# Patient Record
Sex: FEMALE | Race: WHITE | Employment: OTHER | URBAN - METROPOLITAN AREA
[De-identification: names, ages, dates, MRNs, and addresses within clinical notes are randomized per-mention and may not be internally consistent; named-entity substitution may affect disease eponyms.]

---

## 2022-03-25 ENCOUNTER — OFFICE VISIT (OUTPATIENT)
Dept: NEUROLOGY | Age: 67
End: 2022-03-25
Payer: MEDICARE

## 2022-03-25 VITALS
DIASTOLIC BLOOD PRESSURE: 74 MMHG | HEART RATE: 88 BPM | HEIGHT: 63 IN | SYSTOLIC BLOOD PRESSURE: 111 MMHG | WEIGHT: 165 LBS | BODY MASS INDEX: 29.23 KG/M2

## 2022-03-25 DIAGNOSIS — R20.0 NUMBNESS IN BOTH HANDS: ICD-10-CM

## 2022-03-25 DIAGNOSIS — G25.0 BENIGN ESSENTIAL TREMOR: Primary | ICD-10-CM

## 2022-03-25 PROCEDURE — 99204 OFFICE O/P NEW MOD 45 MIN: CPT | Performed by: PSYCHIATRY & NEUROLOGY

## 2022-03-25 RX ORDER — SACUBITRIL AND VALSARTAN 24; 26 MG/1; MG/1
1 TABLET, FILM COATED ORAL 2 TIMES DAILY
COMMUNITY

## 2022-03-25 RX ORDER — CARVEDILOL 3.12 MG/1
6.25 TABLET ORAL
COMMUNITY

## 2022-03-25 RX ORDER — FOLIC ACID 1 MG/1
1 TABLET ORAL DAILY
COMMUNITY

## 2022-03-25 RX ORDER — DIPHENHYDRAMINE HCL 25 MG
25 TABLET ORAL EVERY 6 HOURS PRN
COMMUNITY

## 2022-03-25 RX ORDER — CITALOPRAM 10 MG/1
10 TABLET ORAL DAILY
COMMUNITY

## 2022-03-25 RX ORDER — ASPIRIN 81 MG/1
81 TABLET ORAL DAILY
COMMUNITY

## 2022-03-25 RX ORDER — FUROSEMIDE 20 MG/1
20 TABLET ORAL DAILY
COMMUNITY

## 2022-03-25 RX ORDER — BIOTIN 10 MG
TABLET ORAL
COMMUNITY

## 2022-03-25 RX ORDER — PREDNISONE 10 MG/1
10 TABLET ORAL DAILY
COMMUNITY

## 2022-03-25 NOTE — PROGRESS NOTES
NEUROLOGY CONSULTATION     Chief Complaint   Patient presents with    New Patient       HISTORY OF PRESENT ILLNESS :    Geraldo Arnold is a 69 y.o. female who is referred by Dr. Soumya Gordon   History was obtained from patient  Additional history was obtained from her . Patient was referred for evaluation of tremors. This involves both hands but the left is worse than the right. Symptoms started approximately a year ago. Patient also complains of tingling and numbness in both hands. These are worse at night. 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 She has had nonischemic cardiomyopathy. Patient has a pacemaker. She was found to be in heart block after she had episode of syncope. Patient has a family history of similar tremors in her mother.     REVIEW OF SYSTEMS    Constitutional:  []   Chills   []  Fatigue   []  Fevers   []  Malaise   []  Weight loss     [x] Denies all of the above    Eyes:  []  Double vision   []  Blurry vision     [x] Denies all of the above    Ears, nose, mouth, throat, and face:   [] Hearing loss    []   Hoarseness      [x]  Snoring    []  Tinnitus       [] Denies all of the above     Respiratory:   []  Cough    []  Shortness of breath         [] Denies all of the above     Cardiovascular:   []  Chest pain    []  Exertional chest pressure/discomfort           [] Palpitations    [x]  Syncope     [] Denies all of the above    Gastrointestinal:   []  Abdominal pain   []  Constipation    []  Diarrhea    []   Dysphagia                      [x] Denies all of the above    Genitourinary:      []  Frequency   []  Hematuria     []  Urinary incontinence           [x] Denies all of the above     Hematologic/lymphatic:  []  Bleeding    [x]  Easy bruising   []  Anemia  [] Denies all of the above     Musculoskeletal:   [] Back pain       []  Myalgias    []  Neck pain           [x] Denies all of the above    Neurological: As noted in HPI    Behavioral/Psych:   [] Anxiety    []  Depression     []  Mood swings     [x] Denies all of the above     Endocrine:   []  Temperature intolerance     [x] Fatigue      [] Denies all of the above     Allergic/Immunologic:   [] Hay fever    [] Denies all of the above     No past medical history on file. No family history on file. Social History     Socioeconomic History    Marital status:      Spouse name: Not on file    Number of children: Not on file    Years of education: Not on file    Highest education level: Not on file   Occupational History    Not on file   Tobacco Use    Smoking status: Not on file    Smokeless tobacco: Not on file   Substance and Sexual Activity    Alcohol use: Not on file    Drug use: Not on file    Sexual activity: Not on file   Other Topics Concern    Not on file   Social History Narrative    Not on file     Social Determinants of Health     Financial Resource Strain:     Difficulty of Paying Living Expenses: Not on file   Food Insecurity:     Worried About Running Out of Food in the Last Year: Not on file    Jayme of Food in the Last Year: Not on file   Transportation Needs:     Lack of Transportation (Medical): Not on file    Lack of Transportation (Non-Medical):  Not on file   Physical Activity:     Days of Exercise per Week: Not on file    Minutes of Exercise per Session: Not on file   Stress:     Feeling of Stress : Not on file   Social Connections:     Frequency of Communication with Friends and Family: Not on file    Frequency of Social Gatherings with Friends and Family: Not on file    Attends Church Services: Not on file    Active Member of Clubs or Organizations: Not on file    Attends Club or Organization Meetings: Not on file    Marital Status: Not on file   Intimate Partner Violence:     Fear of Current or Ex-Partner: Not on file    Emotionally Abused: Not on file    Physically Abused: Not on file    Sexually Abused: Not on file   Housing Stability:     Unable to Pay for Housing in the Last Year: Not on file    Number of Places Lived in the Last Year: Not on file    Unstable Housing in the Last Year: Not on file       PHYSICAL EXAMINATION:  /74   Pulse 88   Ht 5' 3\" (1.6 m)   Wt 165 lb (74.8 kg)   BMI 29.23 kg/m²   Appearance: Well appearing, well nourished and in no distress  Mental Status Exam: Patient is alert, oriented to person, place and time. Recent and remote memory is normal  Fund of Knowledge is normal  Attention/concentration is normal.   Speech : No dysarthria  Language : No aphasia  Funduscopic Exam: sharp disc margins  Cranial Nerves:   II: Visual fields:  Full to confrontation  III: Pupils:  equal, round, reactive to light  III,IV,VI: Extra Ocular Movements are intact. No nystagmus  V: Facial sensation is intact to pin prick and light touch  VII: Facial strength and movements: intact and symmetric smile,cheek puffing and eyebrow elevation  VIII: Hearing:  Intact to finger rub bilaterally  IX: Palate  elevation is symmetric  XI: Shoulder shrug is intact  XII: Tongue movements are normal  Motor:  Muscle tone and bulk are normal.  Patient has mild tremors of the outstretched hands. Strength is symmetrical 5/5 in all four extremities. Sensory: Intact to light touch and  pin prick in all four extremities  Coordination:  Normal  Finger to Nose and Heel to Shin bilaterally    . Reflexes:  DTR +2 and symmetric bilaterally  Plantar response: Flexor bilaterally  Gait: Gait and station is normal. Patient can toe/ heel and tandem walk without difficulty  Romberg: negative  Vascular: No carotid bruit bilaterally        DATA:  Lab test from outside hospital were reviewed.     IMPRESSION :  Benign familial essential tremor, mild  No clinical evidence for Parkinson's disease  Numbness in hands, probably carpal tunnel syndrome versus ulnar nerve entrapment      RECOMMENDATIONS :  Discussed with patient and her   Since the tremors are fairly mild we decided not to put her on any medication at this time  Primidone would not be a good drug since it would interact with her blood thinner Eliquis  Beta-blockers may also be a problem because of for pulmonary sarcoidosis but hopefully can be used in a small dose if necessary later after checking with her pulmonologist  I will see her back in the next several weeks to do EMG nerve conduction study of both upper extremities. Thank you for this consultation        Please note a portion of this chart was generated using dragon dictation software. Although every effort was made to ensure the accuracy of this automated transcription, some errors in transcription may have occurred.

## 2022-04-20 ENCOUNTER — PROCEDURE VISIT (OUTPATIENT)
Dept: NEUROLOGY | Age: 67
End: 2022-04-20

## 2022-04-20 ENCOUNTER — OFFICE VISIT (OUTPATIENT)
Dept: NEUROLOGY | Age: 67
End: 2022-04-20
Payer: MEDICARE

## 2022-04-20 VITALS
WEIGHT: 165 LBS | TEMPERATURE: 98 F | RESPIRATION RATE: 14 BRPM | HEIGHT: 63 IN | SYSTOLIC BLOOD PRESSURE: 104 MMHG | BODY MASS INDEX: 29.23 KG/M2 | HEART RATE: 73 BPM | DIASTOLIC BLOOD PRESSURE: 73 MMHG

## 2022-04-20 DIAGNOSIS — G56.23 ENTRAPMENT OF BOTH ULNAR NERVES AT ELBOW: Primary | ICD-10-CM

## 2022-04-20 DIAGNOSIS — G25.0 BENIGN ESSENTIAL TREMOR: Primary | ICD-10-CM

## 2022-04-20 PROCEDURE — 95911 NRV CNDJ TEST 9-10 STUDIES: CPT | Performed by: PSYCHIATRY & NEUROLOGY

## 2022-04-20 PROCEDURE — 99213 OFFICE O/P EST LOW 20 MIN: CPT | Performed by: PSYCHIATRY & NEUROLOGY

## 2022-04-20 PROCEDURE — 95886 MUSC TEST DONE W/N TEST COMP: CPT | Performed by: PSYCHIATRY & NEUROLOGY

## 2022-04-20 RX ORDER — SPIRONOLACTONE 25 MG/1
25 TABLET ORAL DAILY
COMMUNITY
Start: 2022-03-25

## 2022-04-20 NOTE — PROGRESS NOTES
Rocco Zheng   Neurology followup    Subjective:   CC/HP  History was obtained from the patient. Patient is here for a follow-up visit and EMG studies. She continues to have the tremors in her hands. She also continues to have numbness and tingling in her hands right worse than left. Background history:  Patient was referred for evaluation of tremors. This involves both hands but the left is worse than the right. Symptoms started approximately a year ago. Patient also complains of tingling and numbness in both hands. These are worse at night. 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 She has had nonischemic cardiomyopathy. Patient has a pacemaker. She was found to be in heart block after she had episode of syncope.   Patient has a family history of similar tremors in her mother.       REVIEW OF SYSTEMS    Constitutional:  []   Chills   []  Fatigue   []  Fevers   []  Malaise   []  Weight loss     [] Denies all of the above    Respiratory:   []  Cough    []  Shortness of breath         [] Denies all of the above     Cardiovascular:   []  Chest pain    []  Exertional chest pressure/discomfort           [] Palpitations    []  Syncope     [] Denies all of the above        Past Medical History:   Diagnosis Date    Abdominal pain     Bundle branch block, left     with left posterior fascicular block    Cardiac sarcoidosis (HCC)     Carpal tunnel syndrome     Cervical strain     CHF (congestive heart failure) (HCC)     Chronic ethmoidal sinusitis     Chronic maxillary sinusitis     Colonic obstruction (HCC)     COVID-19     History of colon polyps     Hyperglycemia     Hypertension     Non-caseating granuloma     Osteoarthritis     Pericardial constriction     Pleural effusion in other conditions classified elsewhere     Pulmonary sarcoidosis (HCC)     Right bundle branch block     S/P pericardial window creation     Sarcoidosis     Sick sinus syndrome ([address]     Tremor      Family History   Problem Relation Age of Onset    Dementia Father     Other Sister     Breast Cancer Sister     Breast Cancer Maternal Aunt     Colon Cancer Other      Social History     Socioeconomic History    Marital status:      Spouse name: None    Number of children: None    Years of education: None    Highest education level: None   Occupational History    None   Tobacco Use    Smoking status: Never Smoker    Smokeless tobacco: Never Used   Vaping Use    Vaping Use: Never used   Substance and Sexual Activity    Alcohol use: Yes     Alcohol/week: 7.0 standard drinks     Types: 7 Shots of liquor per week    Drug use: Never    Sexual activity: Yes     Partners: Male   Other Topics Concern    None   Social History Narrative    None     Social Determinants of Health     Financial Resource Strain:     Difficulty of Paying Living Expenses: Not on file   Food Insecurity:     Worried About Running Out of Food in the Last Year: Not on file    Jayme of Food in the Last Year: Not on file   Transportation Needs:     Lack of Transportation (Medical): Not on file    Lack of Transportation (Non-Medical):  Not on file   Physical Activity:     Days of Exercise per Week: Not on file    Minutes of Exercise per Session: Not on file   Stress:     Feeling of Stress : Not on file   Social Connections:     Frequency of Communication with Friends and Family: Not on file    Frequency of Social Gatherings with Friends and Family: Not on file    Attends Mu-ism Services: Not on file    Active Member of Clubs or Organizations: Not on file    Attends Club or Organization Meetings: Not on file    Marital Status: Not on file   Intimate Partner Violence:     Fear of Current or Ex-Partner: Not on file    Emotionally Abused: Not on file    Physically Abused: Not on file    Sexually Abused: Not on file   Housing Stability:     Unable to Pay for Housing in the Last Year: Not on file    Number of Places Lived in the Last Year: Not on file    Unstable Housing in the Last Year: Not on file        Objective:  Exam:  /73   Pulse 73   Temp 98 °F (36.7 °C)   Resp 14   Ht 5' 3\" (1.6 m)   Wt 165 lb (74.8 kg)   BMI 29.23 kg/m²   This is a well-nourished patient in no acute distress  Patient is awake, alert and oriented x3. Speech is normal.  Pupils are equal round reacting to light. Extraocular movements intact. Face symmetrical. Tongue midline. Motor exam shows normal symmetrical strength. Deep tendon reflexes normal. Plantar reflexes downgoing. Mild tremors of the outstretched hands  Sensory exam normal. Coordination normal. Gait normal. No carotid bruit. No neck stiffness. 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 There was also mild carpal tunnel syndrome. Plan :  Discussed with patient and her   Since the tremors are fairly mild we decided not to put her on any medication at this time  Primidone would not be a good drug since it would interact with her blood thinner Eliquis  Beta-blockers may also be a problem because of for pulmonary sarcoidosis but hopefully can be used in a small dose if necessary later after checking with her pulmonologist  Conservative management for the ulnar nerve entrapment at the elbow at this time. If symptoms get worse she will see a hand surgeon of her choice. I will see her back in 6 months for follow-up. Please note a portion of  this chart was generated using dragon dictation software. Although every effort was made to ensure the accuracy of this automated transcription, some errors in transcription may have occurred.

## 2022-04-20 NOTE — PROGRESS NOTES
Zoe Vargas M.D. [facility]) Physicians/[place] [facility]  Board Certified in [address] [facility] [address] [facility], [address]    EMG / NERVE CONDUCTION STUDY      PATIENT:  Rocco Zheng       DATE OF EM22     YOB: 1955       REASON FOR EMG:   Bilateral hand numbness      REFERRING PHYSICIAN:  Zoe Vargas MD  [address] [place] [address], [address] [facility],  [address]     SUMMARY:   Bilateral median sensory nerve studies with slightly prolonged distal latencies. Bilateral median motor nerve studies were normal.  Bilateral ulnar motor nerve studies with slowing of conduction velocities across the elbow. Bilateral ulnar sensory nerve studies were normal.  The left radial sensory nerve study was normal.  Needle EMG of several muscles in both upper extremities was normal.      CLINICAL DIAGNOSIS:  Carpal tunnel syndrome        EMG RESULTS:     1. This patient has moderately severe bilateral ulnar nerve lesions at the elbow. The right side is more involved when compared to the left side. 2.  This patient also has mild bilateral median nerve lesions at the wrist.  (Carpal tunnel syndrome. Again, the right side is slightly worse than the left side. ---------------------------------------------  Zoe Vargas M.D.   Electromyographer / Neurologist

## 2023-01-03 ENCOUNTER — OFFICE VISIT (OUTPATIENT)
Dept: NEUROLOGY | Age: 68
End: 2023-01-03
Payer: MEDICARE

## 2023-01-03 VITALS
HEIGHT: 63 IN | HEART RATE: 80 BPM | WEIGHT: 165 LBS | DIASTOLIC BLOOD PRESSURE: 59 MMHG | SYSTOLIC BLOOD PRESSURE: 97 MMHG | BODY MASS INDEX: 29.23 KG/M2

## 2023-01-03 DIAGNOSIS — G25.0 BENIGN ESSENTIAL TREMOR: Primary | ICD-10-CM

## 2023-01-03 PROCEDURE — 99213 OFFICE O/P EST LOW 20 MIN: CPT | Performed by: PSYCHIATRY & NEUROLOGY

## 2023-01-03 PROCEDURE — 1123F ACP DISCUSS/DSCN MKR DOCD: CPT | Performed by: PSYCHIATRY & NEUROLOGY

## 2023-01-03 RX ORDER — ROSUVASTATIN CALCIUM 10 MG/1
TABLET, COATED ORAL
COMMUNITY
Start: 2022-11-30

## 2023-01-03 NOTE — PROGRESS NOTES
Chris Cain   Neurology followup    Subjective:   CC/HP  History was obtained from the patient. Patient is here for a follow-up visit   Patient stated that the tremors are well controlled and she is very minimal tremors noted. She also continues to have numbness and tingling in her hands right worse than left. Background history:  Patient was referred for evaluation of tremors. This involves both hands but the left is worse than the right. Symptoms started approximately a year ago. Patient also complains of tingling and numbness in both hands. These are worse at night. 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 She has had nonischemic cardiomyopathy. Patient has a pacemaker. She was found to be in heart block after she had episode of syncope. Patient has a family history of similar tremors in her mother.        REVIEW OF SYSTEMS    Constitutional:  []   Chills   [x]  Fatigue   []  Fevers   []  Malaise   []  Weight loss     [] Denies all of the above    Respiratory:   []  Cough    [x]  Shortness of breath         [] Denies all of the above     Cardiovascular:   [x]  Chest pain    [x]  Exertional chest pressure/discomfort           [] Palpitations    []  Syncope     [] Denies all of the above        Past Medical History:   Diagnosis Date    Abdominal pain     Bundle branch block, left     with left posterior fascicular block    Cardiac sarcoidosis (HCC)     Carpal tunnel syndrome     Cervical strain     CHF (congestive heart failure) (HCC)     Chronic ethmoidal sinusitis     Chronic maxillary sinusitis     Colonic obstruction ([address]     COVID-19     History of colon polyps     Hyperglycemia     Hypertension     Non-caseating granuloma     Osteoarthritis     Pericardial constriction     Pleural effusion in other conditions classified elsewhere     Pulmonary sarcoidosis (HCC)     Right bundle branch block     S/P pericardial window creation     Sarcoidosis     Sick sinus syndrome ([address]    Tremor      Family History   Problem Relation Age of Onset    Dementia Father     Other Sister     Breast Cancer Sister     Breast Cancer Maternal Aunt     Colon Cancer Other      Social History     Socioeconomic History    Marital status:      Spouse name: None    Number of children: None    Years of education: None    Highest education level: None   Tobacco Use    Smoking status: Never    Smokeless tobacco: Never   Vaping Use    Vaping Use: Never used   Substance and Sexual Activity    Alcohol use: Yes     Alcohol/week: 7.0 standard drinks     Types: 7 Shots of liquor per week    Drug use: Never    Sexual activity: Yes     Partners: Male        Objective:  Exam:  BP (!) 97/59   Pulse 80   Ht 5' 3\" (1.6 m)   Wt 165 lb (74.8 kg)   BMI 29.23 kg/m²   This is a well-nourished patient in no acute distress  Patient is awake, alert and oriented x3. Speech is normal.  Pupils are equal round reacting to light. Extraocular movements intact. Face symmetrical. Tongue midline.  Motor exam shows normal symmetrical strength. Deep tendon reflexes normal. Plantar reflexes downgoing.  Mild tremors of the outstretched hands  Sensory exam normal. Coordination normal. Gait normal. No carotid bruit. No neck stiffness.      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  There was also mild carpal tunnel syndrome.    Plan :  Discussed with patient and her   Since the tremors are fairly mild we decided not to put her on any medication at this time  Primidone would not be a good drug since it would interact with her blood thinner Eliquis  Beta-blockers may also be a problem because of for pulmonary sarcoidosis but hopefully can be used in a small dose if necessary later after checking with her  pulmonologist  Since her tremors are very minimal and stable, I will see her only on a as needed basis. Please note a portion of  this chart was generated using dragon dictation software. Although every effort was made to ensure the accuracy of this automated transcription, some errors in transcription may have occurred.

## 2023-04-24 ENCOUNTER — HOSPITAL ENCOUNTER (OUTPATIENT)
Dept: PHYSICAL THERAPY | Age: 68
Setting detail: THERAPIES SERIES
Discharge: HOME | End: 2023-04-24
Payer: MEDICARE

## 2023-04-24 DIAGNOSIS — R29.898 DECREASED STRENGTH OF UPPER EXTREMITY: ICD-10-CM

## 2023-04-24 DIAGNOSIS — M25.611 DECREASED ROM OF RIGHT SHOULDER: ICD-10-CM

## 2023-04-24 DIAGNOSIS — M25.511 RIGHT SHOULDER PAIN, UNSPECIFIED CHRONICITY: Primary | ICD-10-CM

## 2023-04-24 PROCEDURE — 97110 THERAPEUTIC EXERCISES: CPT

## 2023-04-24 PROCEDURE — 97161 PT EVAL LOW COMPLEX 20 MIN: CPT

## 2023-04-24 NOTE — PLAN OF CARE
[facility] [address]  Phone: [phone]   Fax: [phone]          Physical Therapy Certification    Dear Laurence Adam DO,    We had the pleasure of evaluating the following patient for physical therapy services at [address] [place] [address]. A summary of our findings can be found in the initial assessment below. This includes our plan of care. If you have any questions or concerns regarding these findings, please do not hesitate to contact me at the office phone number checked above. Thank you for the referral.       Physician Signature:_______________________________Date:__________________  By signing above (or electronic signature), therapists plan is approved by physician      Patient: Chandrika Stephens   : 1955   MRN: [id number]  Referring Physician: Laurence Adam DO      Evaluation Date: 2023     Medical Diagnosis Information:  Diagnosis: M75.41 Impingement right shoulder, M75.21: Bicipital Tendinitis                              ICD-10-CM    1. Right shoulder pain, unspecified chronicity  M25.511       2. Decreased ROM of right shoulder  M25.611       3. Decreased strength of upper extremity  R29.898                              Precautions/ Contra-indications: Defibrillator   Latex Allergy:  [x]NO      []YES  Preferred Language for Healthcare:   [x]English       []other:    C-SSRS Triggered by Intake questionnaire (Past 2 wk assessment ):   [x] No, Questionnaire did not trigger screening.   [] Yes, Patient intake triggered C-SSRS Screening      [] C-SSRS Screening completed  [] PCP notified via Epic     SUBJECTIVE: Patient stated complaint: Paul states that she has been having shoulder pain. She has arthritis in both shoulder. She is having pain when sleeping. Since the cortisone injection she has been able to sleep better. She also states that it has been very limiting.  She

## 2023-04-24 NOTE — FLOWSHEET NOTE
[facility] [address] [facility]  Phone: [phone] Fax: [phone]    Physical Therapy Treatment Note/ Progress Report:       Date:  2023    Patient Name:  Lissy Gupta    :  1955  MRN: [id number]  Restrictions/Precautions:    Medical/Treatment Diagnosis Information:  Diagnosis: M75.41 Impingement right shoulder, M75.21: Bicipital Tendinitis       ICD-10-CM    1. Right shoulder pain, unspecified chronicity  M25.511       2. Decreased ROM of right shoulder  M25.611       3.  Decreased strength of upper extremity  R29.898           Insurance/Certification information:  PT Insurance Information: [address] [place] [address] , [place]  Physician Information:  Vickie Mandel DO  Plan of care signed (Y/N):     Date of Patient follow up with Physician:      Progress Report: []  Yes  []  No     Date Range for reporting period:  Beginnin2023  Ending:     Progress report due (10 Rx/or 30 days whichever is less):      Recertification due (POC duration/ or 90 days whichever is less): 2023     Visit # Insurance Allowable Auth Needed   1 MN []Yes    []No     Pain level:  1-6/10     SUBJECTIVE:  See eval    OBJECTIVE: See eval  Observation:   Test measurements:      RESTRICTIONS/PRECAUTIONS: Defibrillator    Exercises/Interventions:   Therapeutic Ex (69349)  Min: Sets/sec Reps CUES/Notes   UBE   NV   Pendulum/Ball rolls      Cane AAROM flex/press      3 way Isomet      T- band Row/pinch      T- band lower pinch      T- band ER activation      Supine SA punch      SL ER/SL punch      Prone Rows/ext      Prone HAB/Prone Flex      Seat Table slides/Wall Slides      Seated HH Depression      No Money      Scap Wall Lat touches/wall walks            Standing flex/scap      Standing Punch      Lawnmower      AAROM flexion 1 15    AAROM ER 1 15    AAROM scaption 1 15    Serratus press 5\" 10                Manual Intervention

## 2023-04-26 ENCOUNTER — HOSPITAL ENCOUNTER (OUTPATIENT)
Dept: PHYSICAL THERAPY | Age: 68
Setting detail: THERAPIES SERIES
Discharge: HOME | End: 2023-04-26
Payer: MEDICARE

## 2023-04-26 PROCEDURE — 97112 NEUROMUSCULAR REEDUCATION: CPT

## 2023-04-26 PROCEDURE — 97140 MANUAL THERAPY 1/> REGIONS: CPT

## 2023-04-26 PROCEDURE — 97110 THERAPEUTIC EXERCISES: CPT

## 2023-04-26 NOTE — FLOWSHEET NOTE
with scapular clock and prone row/extension. Paul stated she was adequately fatigued by the end of the session. No pain. Will cont to benefit from skilled PT interventions to progress to PLOF. Return to Play: (if applicable)   []  Stage 1: Intro to Strength   []  Stage 2: Dynamic Strength and Intro to Plyometrics   []  Stage 3: Advanced Plyometrics and Intro to Throwing   []  Stage 4: Sport specific Training/Return to Sport     []  Ready to Return to Play, [employer]   []  Not Ready for Return to Sports   Comments:      Treatment/Activity Tolerance:  [x] Patient tolerated treatment well [] Patient limited by fatique  [] Patient limited by pain  [] Patient limited by other medical complications  [] Other:     Overall Progression Towards Functional goals/ Treatment Progress Update:  [] Patient is progressing as expected towards functional goals listed. [] Progression is slowed due to complexities/Impairments listed. [] Progression has been slowed due to co-morbidities. [x] Plan just implemented, too soon to assess goals progression <30days   [] Goals require adjustment due to lack of progress  [] Patient is not progressing as expected and requires additional follow up with physician  [] Other    Prognosis for POC: [x] Good [] Fair  [] Poor    Patient requires continued skilled intervention: [x] Yes  [] No      PLAN: See eval  [] Continue per plan of care [] Alter current plan (see comments)  [x] Plan of care initiated [] Hold pending MD visit [] Discharge    Electronically signed by: Aggie Dalton, PT DPT, MS    Note: If patient does not return for scheduled/recommended follow up visits, this note will serve as a discharge from care along with the most recent update on progress.

## 2023-05-01 ENCOUNTER — HOSPITAL ENCOUNTER (OUTPATIENT)
Dept: PHYSICAL THERAPY | Age: 68
Setting detail: THERAPIES SERIES
Discharge: HOME | End: 2023-05-01
Payer: MEDICARE

## 2023-05-01 PROCEDURE — 97110 THERAPEUTIC EXERCISES: CPT

## 2023-05-01 PROCEDURE — 97140 MANUAL THERAPY 1/> REGIONS: CPT

## 2023-05-01 PROCEDURE — 97112 NEUROMUSCULAR REEDUCATION: CPT

## 2023-05-01 NOTE — FLOWSHEET NOTE
[facility] [address]  Phone: [phone] Fax: [phone]    Physical Therapy Treatment Note/ Progress Report:       Date:  2023    Patient Name:  Burgess Rosario    :  1955  MRN: [id number]  Restrictions/Precautions:    Medical/Treatment Diagnosis Information:  Diagnosis: M75.41 Impingement right shoulder, M75.21: Bicipital Tendinitis       ICD-10-CM    1. Right shoulder pain, unspecified chronicity  M25.511       2. Decreased ROM of right shoulder  M25.611       3. Decreased strength of upper extremity  R29.898           Insurance/Certification information:  PT Insurance Information: [address] [place] [address] , [place]  Physician Information:  Yareli Constantino DO  Plan of care signed (Y/N):     Date of Patient follow up with Physician:      Progress Report: []  Yes  []  No     Date Range for reporting period:  Beginnin2023  Ending:     Progress report due (10 Rx/or 30 days whichever is less):      Recertification due (POC duration/ or 90 days whichever is less): 2023     Visit # Insurance Allowable Auth Needed   3 MN []Yes    []No     Pain level:  1-6/10     SUBJECTIVE:  Patient reports that her shoulder is doing well. Still having soreness an pain at times.      OBJECTIVE: See eval  Observation:   Test measurements:      RESTRICTIONS/PRECAUTIONS: Defibrillator    Exercises/Interventions:   Therapeutic Ex (95827)  Min: Sets/sec Reps CUES/Notes   UBE  4'    Pendulum/Ball rolls      Cane AAROM flex/press      3 way Isomet      T- band Row/pinch 2 10 red   T- band lower pinch 2 10 red   T- band ER activation      Supine SA punch      SL ER/SL punch      Prone Rows/ext      Prone HAB/Prone Flex      Seat Table slides/Wall Slides      Seated HH Depression      No Money 2 10 red   Scap Wall Lat touches/wall walks      FR AAROM flexion 1 20    Scapular Clock 1 10 Red, BL   Standing flex/scap      Standing

## 2023-05-03 ENCOUNTER — HOSPITAL ENCOUNTER (OUTPATIENT)
Dept: PHYSICAL THERAPY | Age: 68
Setting detail: THERAPIES SERIES
Discharge: HOME | End: 2023-05-03
Payer: MEDICARE

## 2023-05-03 NOTE — PROGRESS NOTES
[facility]     Physical Therapy  Cancellation/No-show Note  Patient Name:  Jennyfer Roque  :  1955   Date:  5/3/2023  Cancelled visits to date: 1  No-shows to date: 0    Patient status for today's appointment patient:  [x]  Cancelled  []  Rescheduled appointment  []  No-show     Reason given by patient:  [x]  Patient ill  []  Conflicting appointment  []  No transportation    []  Conflict with work  []  No reason given  []  Other:     Comments:      Phone call information:   []  Phone call made today to patient at _ time at number provided:      []  Patient answered, conversation as follows:    []  Patient did not answer, message left as follows:  []  Phone call not made today  [x]  Phone call not needed - pt contacted us to cancel and provided reason for cancellation.      Electronically signed by:  Mariana Velasco PT DPT, MS

## 2023-05-05 ENCOUNTER — APPOINTMENT (OUTPATIENT)
Dept: PHYSICAL THERAPY | Age: 68
End: 2023-05-05
Payer: MEDICARE

## 2023-05-10 ENCOUNTER — HOSPITAL ENCOUNTER (OUTPATIENT)
Dept: PHYSICAL THERAPY | Age: 68
Setting detail: THERAPIES SERIES
Discharge: HOME | End: 2023-05-10
Payer: MEDICARE

## 2023-05-10 PROCEDURE — 97140 MANUAL THERAPY 1/> REGIONS: CPT

## 2023-05-10 PROCEDURE — 97110 THERAPEUTIC EXERCISES: CPT

## 2023-05-10 PROCEDURE — 97112 NEUROMUSCULAR REEDUCATION: CPT

## 2023-05-10 NOTE — FLOWSHEET NOTE
[facility] [address]  Phone: [phone] Fax: [phone]    Physical Therapy Treatment Note/ Progress Report:       Date:  05/10/2023    Patient Name:  Maycol Maya    :  1955  MRN: [id number]  Restrictions/Precautions:    Medical/Treatment Diagnosis Information:  Diagnosis: M75.41 Impingement right shoulder, M75.21: Bicipital Tendinitis       ICD-10-CM    1. Right shoulder pain, unspecified chronicity  M25.511       2. Decreased ROM of right shoulder  M25.611       3. Decreased strength of upper extremity  R29.898           Insurance/Certification information:  PT Insurance Information: Lat[employer]ha Gtz  Physician Information:  Rivka [facility] DO  Plan of care signed (Y/N):     Date of Patient follow up with Physician:      Progress Report: []  Yes  []  No     Date Range for reporting period:  Beginnin2023  Ending:     Progress report due (10 Rx/or 30 days whichever is less):      Recertification due (POC duration/ or 90 days whichever is less): 2023     Visit # Insurance Allowable Auth Needed   4 MN []Yes    []No     Pain level:  -6/10     SUBJECTIVE:  Patient reports that she continues to show progress, is unsure if it is the cortisone injection or PT. She states that she does notice increased pain free ROM after manual interventions.      OBJECTIVE: See eval  Observation:   Test measurements:      RESTRICTIONS/PRECAUTIONS: Defibrillator    Exercises/Interventions:   Therapeutic Ex (05507)  Min: Sets/sec Reps CUES/Notes   UBE  4'    Pendulum/Ball rolls      Cane AAROM flex/press      3 way Isomet      T- band Row/pinch 2 10 red   T- band lower pinch 2 10 red   T- band ER activation      Supine SA punch      SL ER/SL punch      Prone Rows/ext      Prone HAB/Prone Flex      Seat Table slides/Wall Slides      Seated HH Depression      No Money 2 10 red   Scap Wall Lat touches/wall walks

## 2023-05-12 ENCOUNTER — HOSPITAL ENCOUNTER (OUTPATIENT)
Dept: PHYSICAL THERAPY | Age: 68
Setting detail: THERAPIES SERIES
Discharge: HOME | End: 2023-05-12
Payer: MEDICARE

## 2023-05-12 PROCEDURE — 97110 THERAPEUTIC EXERCISES: CPT

## 2023-05-12 PROCEDURE — 97140 MANUAL THERAPY 1/> REGIONS: CPT

## 2023-05-12 NOTE — FLOWSHEET NOTE
focus on posterior capsule mobility today with manual joint mobilizations and self stretches more specifically working on this area such as doorway ER stretch, IR towel stretch behind back, and butterfly stretching. Updated HEP with these tasks for improved carryover between sessions. Good improvement in OH flexion, ER behind head, and IR behind back noted at session conclusion. Return to Play: (if applicable)   []  Stage 1: Intro to Strength   []  Stage 2: Dynamic Strength and Intro to Plyometrics   []  Stage 3: Advanced Plyometrics and Intro to Throwing   []  Stage 4: Sport specific Training/Return to Sport     []  Ready to Return to Play, [employer]   []  Not Ready for Return to Sports   Comments:      Treatment/Activity Tolerance:  [x] Patient tolerated treatment well [] Patient limited by fatique  [] Patient limited by pain  [] Patient limited by other medical complications  [] Other:     Overall Progression Towards Functional goals/ Treatment Progress Update:  [x] Patient is progressing as expected towards functional goals listed. [] Progression is slowed due to complexities/Impairments listed. [] Progression has been slowed due to co-morbidities. [] Plan just implemented, too soon to assess goals progression <30days   [] Goals require adjustment due to lack of progress  [] Patient is not progressing as expected and requires additional follow up with physician  [] Other    Prognosis for POC: [x] Good [] Fair  [] Poor    Patient requires continued skilled intervention: [x] Yes  [] No      PLAN: 2x per week for 6-8 weeks  [x] Continue per plan of care [] Alter current plan (see comments)  [] Plan of care initiated [] Hold pending MD visit [] Discharge    Electronically signed by: Gunner Corona, PT, DPT, MS, SCS    Note: If patient does not return for scheduled/recommended follow up visits, this note will serve as a discharge from care along with the most recent update on progress.

## 2023-05-17 ENCOUNTER — HOSPITAL ENCOUNTER (OUTPATIENT)
Dept: PHYSICAL THERAPY | Age: 68
Setting detail: THERAPIES SERIES
Discharge: HOME | End: 2023-05-17
Payer: MEDICARE

## 2023-05-17 PROCEDURE — 97140 MANUAL THERAPY 1/> REGIONS: CPT

## 2023-05-17 PROCEDURE — 97110 THERAPEUTIC EXERCISES: CPT

## 2023-05-17 NOTE — FLOWSHEET NOTE
[facility] [address] [facility] [facility] [address]  Phone: [phone] Fax: [phone]    Physical Therapy Treatment Note/ Progress Report:       Date:  2023    Patient Name:  Sierra Tolentino    :  1955  MRN: [id number]  Restrictions/Precautions:    Medical/Treatment Diagnosis Information:  Diagnosis: M75.41 Impingement right shoulder, M75.21: Bicipital Tendinitis       ICD-10-CM    1. Right shoulder pain, unspecified chronicity  M25.511       2. Decreased ROM of right shoulder  M25.611       3. Decreased strength of upper extremity  R29.898           Insurance/Certification information:  PT Insurance Information: [place], [place]  Physician Information:  Sole Ma DO  Plan of care signed (Y/N):     Date of Patient follow up with Physician:      Progress Report: []  Yes  [x]  No     Date Range for reporting period:  Beginnin2023  Ending:     Progress report due (10 Rx/or 30 days whichever is less): 3777     Recertification due (POC duration/ or 90 days whichever is less): 2023     Visit # Insurance Allowable Auth Needed   6 MN []Yes    []No     Pain level:  1-610     SUBJECTIVE:  Patient reports that her shoulder is doing well. Able to reach higher but still has soreness at times.      OBJECTIVE: See eval  Observation:   Test measurements:      RESTRICTIONS/PRECAUTIONS: Defibrillator    Exercises/Interventions:   Therapeutic Ex (70724) Sets/sec Reps CUES/Notes   UBE  4 min 1/2 fwd/bwd   Pendulum/Ball rolls      Cane AAROM flex/press      Doorway ER stretch - for post caps mobility 10s 10 60 deg elevation   IR towel stretch behind back 10s 10    Butterfly stretch - seated in chair, hands behind head 10s 10    Wall wash 1 15    T- band Row/pinch 2 10 red   T- band lower pinch 2 10 red   No Money 2 10 red   FR AAROM flexion 1 20    Bicep curl ecc 5\" 10    Scapular Clock 1 10 Red, BL   Bicep Curl Ecc 5\" 10 5#   AAROM

## 2023-05-19 ENCOUNTER — HOSPITAL ENCOUNTER (OUTPATIENT)
Dept: PHYSICAL THERAPY | Age: 68
Setting detail: THERAPIES SERIES
Discharge: HOME | End: 2023-05-19
Payer: MEDICARE

## 2023-05-19 PROCEDURE — 97140 MANUAL THERAPY 1/> REGIONS: CPT

## 2023-05-19 PROCEDURE — 97110 THERAPEUTIC EXERCISES: CPT

## 2023-05-19 NOTE — FLOWSHEET NOTE
- 5 x weekly - 3 sets - 10 reps  - Standing Shoulder External Rotation AAROM with Dowel  - 1 x daily - 5 x weekly - 3 sets - 10 reps  - Supine Scapular Protraction in Flexion with Dumbbells  - 1 x daily - 5 x weekly - 1-2 sets - 10 reps - 5 seconds hold  - Prone Shoulder Row  - 1 x daily - 5 x weekly - 1-2 sets - 10 reps - 5 seconds hold    Patient Education  - Shoulder Impingement    GOALS:  Patient stated goal: No pain, playing golf, pickle ball  [] Progressing: [] Met: [] Not Met: [] Adjusted  Therapist goals for Patient:   Short Term Goals: To be achieved in: 2 weeks  1. Independent in HEP and progression per patient tolerance, in order to prevent re-injury. [] Progressing: [] Met: [] Not Met: [] Adjusted  2. Patient will have a decrease in pain to facilitate improvement in movement, function, and ADLs as indicated by Functional Deficits. [] Progressing: [] Met: [] Not Met: [] Adjusted    Long Term Goals: To be achieved in: 8 weeks  1. Disability index score of 5% or less for the DASH to assist with reaching prior level of function. [] Progressing: [] Met: [] Not Met: [] Adjusted  2. Patient will demonstrate increased AROM to Flexion 160, abd 150, IR to T12, ER to T4 to allow for proper joint functioning as indicated by patients Functional Deficits. [] Progressing: [] Met: [] Not Met: [] Adjusted  3. Patient will demonstrate an increase in right shoulder Strength to 5/5 to allow for proper functional mobility as indicated by patients Functional Deficits. [] Progressing: [] Met: [] Not Met: [] Adjusted  4. Patient will return to raising arm overhead 2x10 functional activities without increased symptoms or restriction. [] Progressing: [] Met: [] Not Met: [] Adjusted  5. Patient will return to swinging a golf club 1x10 times without increased symptoms or restriction. (patient specific functional goal)    [] Progressing: [] Met: [] Not Met: [] Adjusted     ASSESSMENT: ROM continues to improve.  Continued

## 2023-05-22 ENCOUNTER — HOSPITAL ENCOUNTER (OUTPATIENT)
Dept: PHYSICAL THERAPY | Age: 68
Setting detail: THERAPIES SERIES
Discharge: HOME | End: 2023-05-22
Payer: MEDICARE

## 2023-05-22 PROCEDURE — 97110 THERAPEUTIC EXERCISES: CPT

## 2023-05-22 PROCEDURE — 97112 NEUROMUSCULAR REEDUCATION: CPT

## 2023-05-22 PROCEDURE — 97140 MANUAL THERAPY 1/> REGIONS: CPT

## 2023-05-22 NOTE — FLOWSHEET NOTE
[facility] [address] [address]  Phone: [phone] Fax: [phone]    Physical Therapy Treatment Note/ Progress Report:       Date:  2023    Patient Name:  Jac Meyer    :  1955  MRN: [id number]  Restrictions/Precautions:    Medical/Treatment Diagnosis Information:  Diagnosis: M75.41 Impingement right shoulder, M75.21: Bicipital Tendinitis       ICD-10-CM    1. Right shoulder pain, unspecified chronicity  M25.511       2. Decreased ROM of right shoulder  M25.611       3. Decreased strength of upper extremity  R29.898           Insurance/Certification information:  PT Insurance Information: [address] [place] [address] , [place]  Physician Information:  Toribio Restrepo DO  Plan of care signed (Y/N):     Date of Patient follow up with Physician:      Progress Report: []  Yes  [x]  No     Date Range for reporting period:  Beginnin2023  Ending:     Progress report due (10 Rx/or 30 days whichever is less):      Recertification due (POC duration/ or 90 days whichever is less): 2023     Visit # Insurance Allowable Auth Needed   8 PN [place] []Yes    []No     Pain level:  1-6/10     SUBJECTIVE: Patient states that she was muscular sore after last session. Feels better today.      OBJECTIVE: See eval  Observation:   Test measurements:      RESTRICTIONS/PRECAUTIONS: Defibrillator    Exercises/Interventions:   Therapeutic Ex (58354) Sets/sec Reps CUES/Notes   UBE  4 min 1/2 fwd/bwd   Pendulum/Ball rolls      Cane AAROM flex/press      Doorway ER stretch - for post caps mobility 10s 10 60 deg elevation   IR towel stretch behind back 30s 3    Butterfly stretch - seated in chair, hands behind head 10s 10    Wall wash 1 15    T- band Row/pinch 2 10 red   T- band lower pinch 2 10 red   No Money 2 10 red   FR AAROM flexion 1 20    Bicep curl ecc 5\" 10    Scapular Clock 1 10 Red, BL   Bicep Curl Ecc 5\" 10 5#   AAROM flexion AAROM ER

## 2023-05-24 ENCOUNTER — HOSPITAL ENCOUNTER (OUTPATIENT)
Dept: PHYSICAL THERAPY | Age: 68
Setting detail: THERAPIES SERIES
Discharge: HOME | End: 2023-05-24
Payer: MEDICARE

## 2023-05-24 PROCEDURE — 97140 MANUAL THERAPY 1/> REGIONS: CPT

## 2023-05-24 PROCEDURE — 97110 THERAPEUTIC EXERCISES: CPT

## 2023-05-24 PROCEDURE — 97112 NEUROMUSCULAR REEDUCATION: CPT

## 2023-05-24 NOTE — FLOWSHEET NOTE
[facility] [address] [place] [place] [address]  Phone: [phone] Fax: [phone]    Physical Therapy Treatment Note/ Progress Report:       Date:  2023    Patient Name:  Jerry Galeazzi    :  1955  MRN: [id number]  Restrictions/Precautions:    Medical/Treatment Diagnosis Information:  Diagnosis: M75.41 Impingement right shoulder, M75.21: Bicipital Tendinitis       ICD-10-CM    1. Right shoulder pain, unspecified chronicity  M25.511       2. Decreased ROM of right shoulder  M25.611       3. Decreased strength of upper extremity  R29.898           Insurance/Certification information:  PT Insurance Information: [address] [place] [address] , [place]  Physician Information:  Elena Holman DO  Plan of care signed (Y/N):     Date of Patient follow up with Physician:      Progress Report: [x]  Yes  []  No     Date Range for reporting period:  Beginnin2023  Ending:     Progress report due (10 Rx/or 30 days whichever is less):      Recertification due (POC duration/ or 90 days whichever is less): 2023     Visit # Insurance Allowable Auth Needed   9 MN []Yes    []No     Pain level:  1-6/10     SUBJECTIVE: Patient states that she has made 85% improvement since starting therapy for her shoulder. She states that she is still having difficulty with reaching - she still gets twinges of pain every once in a while. She is able to place her hand behind her head now when laying down. She states that she is concerned for her defibrillator as she can feel it more through her skin.      Functional Disability Index:QDASH: : 34%    Functional Disability Index:QDASH: : 16%     OBJECTIVE: See eval  Observation:   Test measurements:      :             ROM PROM AROM  Comment     L R L R     Flexion     165 165     Abduction     170 130  170 after manual   ER     T6 T4     IR     T6 L2     Other             Other

## 2023-05-31 ENCOUNTER — HOSPITAL ENCOUNTER (OUTPATIENT)
Dept: PHYSICAL THERAPY | Age: 68
Setting detail: THERAPIES SERIES
Discharge: HOME | End: 2023-05-31
Payer: MEDICARE

## 2023-05-31 PROCEDURE — 97140 MANUAL THERAPY 1/> REGIONS: CPT

## 2023-05-31 PROCEDURE — 97112 NEUROMUSCULAR REEDUCATION: CPT

## 2023-05-31 PROCEDURE — 97110 THERAPEUTIC EXERCISES: CPT

## 2023-05-31 NOTE — FLOWSHEET NOTE
[facility] [address] [address]  Phone: [phone] Fax: [phone]    Physical Therapy Treatment Note/ Progress Report:       Date:  2023    Patient Name:  Biju Richard    :  1955  MRN: [id number]  Restrictions/Precautions:    Medical/Treatment Diagnosis Information:  Diagnosis: M75.41 Impingement right shoulder, M75.21: Bicipital Tendinitis       ICD-10-CM    1. Right shoulder pain, unspecified chronicity  M25.511       2. Decreased ROM of right shoulder  M25.611       3. Decreased strength of upper extremity  R29.898           Insurance/Certification information:  PT Insurance Information: [address] [place] [address] , [place]  Physician Information:  Maurisio Rea DO  Plan of care signed (Y/N):     Date of Patient follow up with Physician:      Progress Report: []  Yes  [x]  No     Date Range for reporting period:  Beginnin2023  Ending:     Progress report due (10 Rx/or 30 days whichever is less):      Recertification due (POC duration/ or 90 days whichever is less): 2023     Visit # Insurance Allowable Auth Needed   10 MN []Yes    []No     Pain level:  -6/10     SUBJECTIVE: Ceci Davis states that her shoulder started hurting several days ago out of the blue. Wasn't doing anything to provoke the pain. Pinpoint pain at RTC attachment.      Functional Disability Index:QDASH: : 34%    Functional Disability Index:QDASH: : 16%     OBJECTIVE: See eval  Observation:   Test measurements:      :             ROM PROM AROM  Comment     L R L R     Flexion     165 165     Abduction     170 130  170 after manual   ER     T6 T4     IR     T6 L2     Other             Other                    Strength R L Comment   Flexion 4+ 5     Abduction 4 Pain 5     ER 5 5     IR 5 5     Supraspinatus 4+ 5     Upper Trap 4       Lower Trap 4       Mid Trap         Rhomboids         Biceps 5 5     Triceps 5 5

## 2023-06-02 ENCOUNTER — HOSPITAL ENCOUNTER (OUTPATIENT)
Dept: PHYSICAL THERAPY | Age: 68
Setting detail: THERAPIES SERIES
Discharge: HOME | End: 2023-06-02
Payer: MEDICARE

## 2023-06-02 PROCEDURE — 97112 NEUROMUSCULAR REEDUCATION: CPT

## 2023-06-02 PROCEDURE — 97110 THERAPEUTIC EXERCISES: CPT

## 2023-06-02 PROCEDURE — 97140 MANUAL THERAPY 1/> REGIONS: CPT

## 2023-06-02 NOTE — FLOWSHEET NOTE
level of function. [x] Progressing: [] Met: [] Not Met: [] Adjusted  2. Patient will demonstrate increased AROM to Flexion 160, abd 150, IR to T12, ER to T4 to allow for proper joint functioning as indicated by patients Functional Deficits. [x] Progressing: [] Met: [] Not Met: [] Adjusted  3. Patient will demonstrate an increase in right shoulder Strength to 5/5 to allow for proper functional mobility as indicated by patients Functional Deficits. [x] Progressing: [] Met: [] Not Met: [] Adjusted  4. Patient will return to raising arm overhead 2x10 functional activities without increased symptoms or restriction. [] Progressing: [x] Met: [] Not Met: [] Adjusted  5. Patient will return to swinging a golf club 1x10 times without increased symptoms or restriction. (patient specific functional goal)    [] Progressing: [] Met: [x] Not Met: [] Adjusted     ASSESSMENT:  Venus Cedeno states she her shoulder has been exacerbated since last session. She states that she has most of her pain anterolateral shoulder. This session focused mainly on manual interventions to reduce her pain. Noted cont tenderness at UT, supraspinatus, subscap, lat. ER stretch at various degrees. Noted decreased pain with shoulder flexion after these interventions. Removed isometric ER this date. Removed shoulder extension this date. Will assess thoracic mobility next session to see if it is limiting shoulder motion.       Return to Play: (if applicable)   []  Stage 1: Intro to Strength   []  Stage 2: Dynamic Strength and Intro to Plyometrics   []  Stage 3: Advanced Plyometrics and Intro to Throwing   []  Stage 4: Sport specific Training/Return to Sport     []  Ready to Return to Play, [employer]   []  Not Ready for Return to Sports   Comments:      Treatment/Activity Tolerance:  [x] Patient tolerated treatment well [] Patient limited by fatique  [] Patient limited by pain  [] Patient limited by other medical complications  [] Other:

## 2023-06-05 ENCOUNTER — HOSPITAL ENCOUNTER (OUTPATIENT)
Dept: PHYSICAL THERAPY | Age: 68
Setting detail: THERAPIES SERIES
Discharge: HOME | End: 2023-06-05
Payer: MEDICARE

## 2023-06-05 PROCEDURE — 97140 MANUAL THERAPY 1/> REGIONS: CPT

## 2023-06-05 PROCEDURE — 97530 THERAPEUTIC ACTIVITIES: CPT

## 2023-06-05 NOTE — FLOWSHEET NOTE
Extension 2/5\" 10 Black   Standing Resisted Row 2/5\" 10 Black   Prone extension 5\" 15 5#   Prone scapular retraction 5\" 15 3#         Therapeutic Activity (70777)      UE throwing porgression      Dynamic UE stability      Earthquake Bar      Bodyblade                Therapeutic Exercise and NMR EXR  [x] (54111) Provided verbal/tactile cueing for activities related to strengthening, flexibility, endurance, ROM  for improvements in scapular, scapulothoracic and UE control with self care, reaching, carrying, lifting, house/yardwork, driving/computer work. [x] (96434) Provided verbal/tactile cueing for activities related to improving balance, coordination, kinesthetic sense, posture, motor skill, proprioception  to assist with  scapular, scapulothoracic and UE control with self care, reaching, carrying, lifting, house/yardwork, driving/computer work. Therapeutic Activities:    [x] (69958 or 32471) Provided verbal/tactile cueing for activities related to improving balance, coordination, kinesthetic sense, posture, motor skill, proprioception and motor activation to allow for proper function of scapular, scapulothoracic and UE control with self care, carrying, lifting, driving/computer work.      Home Exercise Program:    [x] (34455) Reviewed/Progressed HEP activities related to strengthening, flexibility, endurance, ROM of scapular, scapulothoracic and UE control with self care, reaching, carrying, lifting, house/yardwork, driving/computer work  [] (24194) Reviewed/Progressed HEP activities related to improving balance, coordination, kinesthetic sense, posture, motor skill, proprioception of scapular, scapulothoracic and UE control with self care, reaching, carrying, lifting, house/yardwork, driving/computer work      Manual Treatments:  PROM / STM / Oscillations-Mobs:  G-I, II, III, IV (PA's, Inf., Post.)  [x] (25994) Provided manual therapy to mobilize soft tissue/joints of cervical/CT, scapular GHJ and UE for

## 2023-06-07 ENCOUNTER — HOSPITAL ENCOUNTER (OUTPATIENT)
Dept: PHYSICAL THERAPY | Age: 68
Setting detail: THERAPIES SERIES
Discharge: HOME | End: 2023-06-07
Payer: MEDICARE

## 2023-06-07 PROCEDURE — 97140 MANUAL THERAPY 1/> REGIONS: CPT

## 2023-06-07 PROCEDURE — 97530 THERAPEUTIC ACTIVITIES: CPT

## 2023-06-07 PROCEDURE — 97112 NEUROMUSCULAR REEDUCATION: CPT

## 2023-06-07 NOTE — FLOWSHEET NOTE
Reviewed/Progressed HEP activities related to improving balance, coordination, kinesthetic sense, posture, motor skill, proprioception of scapular, scapulothoracic and UE control with self care, reaching, carrying, lifting, house/yardwork, driving/computer work      Manual Treatments:  PROM / STM / Oscillations-Mobs:  G-I, II, III, IV (PA's, Inf., Post.)  [x] (92885) Provided manual therapy to mobilize soft tissue/joints of cervical/CT, scapular GHJ and UE for the purpose of modulating pain, promoting relaxation,  increasing ROM, reducing/eliminating soft tissue swelling/inflammation/restriction, improving soft tissue extensibility and allowing for proper ROM for normal function with self care, reaching, carrying, lifting, house/yardwork, driving/computer work    Modalities:      Charges:  Timed Code Treatment Minutes: 52   Total Treatment Minutes: 52       [] EVAL (LOW) 60646 (typically 20 minutes face-to-face)  [] EVAL (MOD) 40200 (typically 30 minutes face-to-face)  [] EVAL (HIGH) 91235 (typically 45 minutes face-to-face)  [] RE-EVAL     [] RF(28782) x     [] DRY NEEDLE 1 OR 2 MUSCLES  [x] NMR (52149) x 1    [] DRY NEEDLE 3+ MUSCLES  [x] Manual (17844) x 1     [x] TA (09547) x   1  [] Mech Traction (19433)  [] ES(attended) (00525)     [] ES (un) (30058):   [] VASO (60673)  [] Other:    HEP instruction:  Access Code: [email or web address]  URL: [email or web address]  Date: 04/24/2023  Prepared by: Delroy Getting    Exercises  - Supine Shoulder Flexion Extension AAROM with Dowel  - 1 x daily - 5 x weekly - 1-2 sets - 10 reps  - Shoulder Scaption AAROM with Dowel  - 1 x daily - 5 x weekly - 3 sets - 10 reps  - Standing Shoulder External Rotation AAROM with Dowel  - 1 x daily - 5 x weekly - 3 sets - 10 reps  - Supine Scapular Protraction in Flexion with Dumbbells  - 1 x daily - 5 x weekly - 1-2 sets - 10 reps - 5 seconds hold  - Prone Shoulder Row  - 1 x daily - 5 x weekly - 1-2 sets - 10 reps - 5 seconds hold    Patient

## 2023-06-28 ENCOUNTER — HOSPITAL ENCOUNTER (OUTPATIENT)
Dept: PHYSICAL THERAPY | Age: 68
Setting detail: THERAPIES SERIES
Discharge: HOME | End: 2023-06-28
Payer: MEDICARE

## 2023-06-28 PROCEDURE — 97110 THERAPEUTIC EXERCISES: CPT

## 2023-06-28 PROCEDURE — 97140 MANUAL THERAPY 1/> REGIONS: CPT

## 2023-06-28 PROCEDURE — 97530 THERAPEUTIC ACTIVITIES: CPT

## 2023-06-30 ENCOUNTER — HOSPITAL ENCOUNTER (OUTPATIENT)
Dept: PHYSICAL THERAPY | Age: 68
Setting detail: THERAPIES SERIES
Discharge: HOME | End: 2023-06-30
Payer: MEDICARE

## 2023-06-30 PROCEDURE — 97112 NEUROMUSCULAR REEDUCATION: CPT

## 2023-06-30 PROCEDURE — 97140 MANUAL THERAPY 1/> REGIONS: CPT

## 2023-07-03 ENCOUNTER — HOSPITAL ENCOUNTER (OUTPATIENT)
Dept: PHYSICAL THERAPY | Age: 68
Setting detail: THERAPIES SERIES
Discharge: HOME | End: 2023-07-03
Payer: MEDICARE

## 2023-07-03 NOTE — PROGRESS NOTES
[address]     Physical Therapy  Cancellation/No-show Note  Patient Name:  Sebastián Ortiz  :  1955   Date:  7/3/2023  Cancelled visits to date: 1  No-shows to date: 0    Patient status for today's appointment patient:  []  Cancelled  []  Rescheduled appointment  []  No-show     Reason given by patient:  [x]  Patient ill  []  Conflicting appointment  []  No transportation    []  Conflict with work  []  No reason given  []  Other:     Comments:      Phone call information:   []  Phone call made today to patient at _ time at number provided:      []  Patient answered, conversation as follows:    []  Patient did not answer, message left as follows:  []  Phone call not made today  [x]  Phone call not needed - pt contacted us to cancel and provided reason for cancellation.      Electronically signed by:  Ryan Loja PT DPT, MS

## 2023-07-05 ENCOUNTER — HOSPITAL ENCOUNTER (OUTPATIENT)
Dept: PHYSICAL THERAPY | Age: 68
Setting detail: THERAPIES SERIES
Discharge: HOME | End: 2023-07-05
Payer: MEDICARE

## 2023-07-05 PROCEDURE — 97110 THERAPEUTIC EXERCISES: CPT

## 2023-07-05 PROCEDURE — 97140 MANUAL THERAPY 1/> REGIONS: CPT

## 2023-07-05 NOTE — FLOWSHEET NOTE
[address], [address] [place] [address]  Phone: [phone] Fax: [phone]  Physical Therapy Treatment Note/ Progress Report:       Date:  2023    Patient Name:  Saturnino Kc    :  1955  MRN: [id number]  Restrictions/Precautions:    Medical/Treatment Diagnosis Information:  Diagnosis: M75.41 Impingement right shoulder, M75.21: Bicipital Tendinitis       ICD-10-CM    1. Right shoulder pain, unspecified chronicity  M25.511       2. Decreased ROM of right shoulder  M25.611       3. Decreased strength of upper extremity  R29.898           Insurance/Certification information:  PT Insurance Information: [id number] [place], [place]  Physician Information:  Gordy Garcia DO  Plan of care signed (Y/N): yes    Date of Patient follow up with Physician:      Progress Report: []  Yes  [x]  No     Date Range for reporting period:  Beginnin2023  Ending: PN   POC     Progress report due (10 Rx/or 30 days whichever is less):      Recertification due (POC duration/ or 90 days whichever is less): 2023     Visit # Insurance Allowable Auth Needed   18 [place] []Yes    []No     Pain level:  -6/10     SUBJECTIVE: Patient states that she has felt more dizzy and nauseous this week. She had to cancel a lot of her plans due to this. She states that her shoulder continues to feel roughly the same. She took a baseline blood glucose at 110. Functional Disability Index:QDASH: : 34%    Functional Disability Index:QDASH: : 16%   Functional Disability Index:QDASH: : 36%     OBJECTIVE: See eval  Observation:   Test measurements:   : AC joint mobs no change, Inferior and posterior joint mobs no change. Scapular mobs hypomobile. STM with some changes to sensitivity at supraspinatus and biceps.      :             ROM PROM AROM  Comment     L R L R     Flexion    155 165 150 145 pre man    Abduction

## 2023-07-12 ENCOUNTER — HOSPITAL ENCOUNTER (OUTPATIENT)
Dept: PHYSICAL THERAPY | Age: 68
Setting detail: THERAPIES SERIES
Discharge: HOME | End: 2023-07-12
Payer: MEDICARE

## 2023-07-12 PROCEDURE — 97140 MANUAL THERAPY 1/> REGIONS: CPT

## 2023-07-12 PROCEDURE — 97110 THERAPEUTIC EXERCISES: CPT

## 2023-07-12 NOTE — FLOWSHEET NOTE
44 [address], [address] [place] [address]  Phone: [phone] Fax: [phone]  Physical Therapy Treatment Note/ Progress Report:       Date:  2023    Patient Name:  Tito Aldridge    :  1955  MRN: [id number]  Restrictions/Precautions:    Medical/Treatment Diagnosis Information:  Diagnosis: M75.41 Impingement right shoulder, M75.21: Bicipital Tendinitis       ICD-10-CM    1. Right shoulder pain, unspecified chronicity  M25.511       2. Decreased ROM of right shoulder  M25.611       3. Decreased strength of upper extremity  R29.898           Insurance/Certification information:  PT Insurance Information: [id number] [place], [place]  Physician Information:  Maranda Mantilla DO  Plan of care signed (Y/N): yes    Date of Patient follow up with Physician:      Progress Report: []  Yes  [x]  No     Date Range for reporting period:  Beginnin2023  Ending: PN   POC     Progress report due (10 Rx/or 30 days whichever is less):      Recertification due (POC duration/ or 90 days whichever is less): 2023     Visit # Insurance Allowable Auth Needed   19 [place] []Yes    []No     Pain level:  -6/10     SUBJECTIVE: Patient states that her dizziness has been improving. However she continues to have shoulder pain. She took a baseline blood glucose at 110. Functional Disability Index:QDASH: : 34%    Functional Disability Index:QDASH: : 16%   Functional Disability Index:QDASH: : 36%     OBJECTIVE: See eval  Observation:   Test measurements:   : AC joint mobs no change, Inferior and posterior joint mobs no change. Scapular mobs hypomobile. STM with some changes to sensitivity at supraspinatus and biceps.      :             ROM PROM AROM  Comment     L R L R     Flexion    155 165 150 145 pre man    Abduction     170 155  170 after manual   ER     T6 T4     IR     T6 L2     Other

## 2023-07-14 ENCOUNTER — HOSPITAL ENCOUNTER (OUTPATIENT)
Dept: PHYSICAL THERAPY | Age: 68
Setting detail: THERAPIES SERIES
Discharge: HOME | End: 2023-07-14
Payer: MEDICARE

## 2023-07-14 PROCEDURE — 97140 MANUAL THERAPY 1/> REGIONS: CPT

## 2023-07-14 PROCEDURE — 97110 THERAPEUTIC EXERCISES: CPT

## 2023-07-14 NOTE — FLOWSHEET NOTE
ES(attended) (94650)     [] ES (un) (36666):   [] VASO (90284)  [] Other:    HEP instruction:  Access Code: [email or web address]  URL: [email or web address]  Date: 04/24/2023  Prepared by: Reji Carlton    Exercises  - Supine Shoulder Flexion Extension AAROM with Dowel  - 1 x daily - 5 x weekly - 1-2 sets - 10 reps  - Shoulder Scaption AAROM with Dowel  - 1 x daily - 5 x weekly - 3 sets - 10 reps  - Standing Shoulder External Rotation AAROM with Dowel  - 1 x daily - 5 x weekly - 3 sets - 10 reps  - Supine Scapular Protraction in Flexion with Dumbbells  - 1 x daily - 5 x weekly - 1-2 sets - 10 reps - 5 seconds hold  - Prone Shoulder Row  - 1 x daily - 5 x weekly - 1-2 sets - 10 reps - 5 seconds hold    Patient Education  - Shoulder Impingement    GOALS:  Patient stated goal: No pain, playing golf, pickle ball  [x] Progressing: [] Met: [] Not Met: [] Adjusted  Therapist goals for Patient:   Short Term Goals: To be achieved in: 2 weeks  1. Independent in HEP and progression per patient tolerance, in order to prevent re-injury. [] Progressing: [x] Met: [] Not Met: [] Adjusted  2. Patient will have a decrease in pain to facilitate improvement in movement, function, and ADLs as indicated by Functional Deficits. [] Progressing: [x] Met: [] Not Met: [] Adjusted    Long Term Goals: To be achieved in: 8 weeks  1. Disability index score of 5% or less for the DASH to assist with reaching prior level of function. [x] Progressing: [] Met: [] Not Met: [] Adjusted  2. Patient will demonstrate increased AROM to Flexion 160, abd 150, IR to T12, ER to T4 to allow for proper joint functioning as indicated by patients Functional Deficits. [x] Progressing: [] Met: [] Not Met: [] Adjusted  3. Patient will demonstrate an increase in right shoulder Strength to 5/5 to allow for proper functional mobility as indicated by patients Functional Deficits. [x] Progressing: [] Met: [] Not Met: [] Adjusted  4.  Patient will return to raising arm

## 2023-07-17 ENCOUNTER — HOSPITAL ENCOUNTER (OUTPATIENT)
Dept: PHYSICAL THERAPY | Age: 68
Setting detail: THERAPIES SERIES
Discharge: HOME | End: 2023-07-17
Payer: MEDICARE

## 2023-07-17 PROCEDURE — 97140 MANUAL THERAPY 1/> REGIONS: CPT

## 2023-07-17 PROCEDURE — 97110 THERAPEUTIC EXERCISES: CPT

## 2023-07-17 NOTE — FLOWSHEET NOTE
[address], [address] [place] [address]  Phone: [phone] Fax: [phone]  Physical Therapy Treatment Note/ Progress Report:       Date:  2023    Patient Name:  Caesar Wright    :  1955  MRN: [id number]  Restrictions/Precautions:    Medical/Treatment Diagnosis Information:  Diagnosis: M75.41 Impingement right shoulder, M75.21: Bicipital Tendinitis       ICD-10-CM    1. Right shoulder pain, unspecified chronicity  M25.511       2. Decreased ROM of right shoulder  M25.611       3. Decreased strength of upper extremity  R29.898           Insurance/Certification information:  PT Insurance Information: [id number] [place], [place]  Physician Information:  Gege Rucker DO  Plan of care signed (Y/N): yes    Date of Patient follow up with Physician:      Progress Report: []  Yes  [x]  No     Date Range for reporting period:  Beginnin2023  Ending: PN   POC     Progress report due (10 Rx/or 30 days whichever is less): 2868     Recertification due (POC duration/ or 90 days whichever is less): 2023     Visit # Insurance Allowable Auth Needed   21 [place] []Yes    []No     Pain level:  -6/10     SUBJECTIVE: Patient states that she feels tight in her shoulder and arm. She states that she has not had any pain. She took a baseline blood glucose at 110. Functional Disability Index:QDASH: : 34%    Functional Disability Index:QDASH: : 16%   Functional Disability Index:QDASH: : 36%     OBJECTIVE: See eval  Observation:   Test measurements:   : Noted increased lymph node swelling under right armpit. : AC joint mobs no change, Inferior and posterior joint mobs no change. Scapular mobs hypomobile. STM with some changes to sensitivity at supraspinatus and biceps.      :             ROM PROM AROM  Comment     L R L R     Flexion    155 165 150 145 pre man    Abduction     170 155  170

## 2023-07-19 ENCOUNTER — HOSPITAL ENCOUNTER (OUTPATIENT)
Dept: PHYSICAL THERAPY | Age: 68
Setting detail: THERAPIES SERIES
Discharge: HOME | End: 2023-07-19
Payer: MEDICARE

## 2023-07-19 PROCEDURE — 97140 MANUAL THERAPY 1/> REGIONS: CPT

## 2023-07-19 PROCEDURE — 97110 THERAPEUTIC EXERCISES: CPT

## 2023-07-19 NOTE — FLOWSHEET NOTE
proper joint functioning as indicated by patients Functional Deficits. [x] Progressing: [] Met: [] Not Met: [] Adjusted  3. Patient will demonstrate an increase in right shoulder Strength to 5/5 to allow for proper functional mobility as indicated by patients Functional Deficits. [x] Progressing: [] Met: [] Not Met: [] Adjusted  4. Patient will return to raising arm overhead 2x10 functional activities without increased symptoms or restriction. [] Progressing: [x] Met: [] Not Met: [] Adjusted  5. Patient will return to swinging a golf club 1x10 times without increased symptoms or restriction. (patient specific functional goal)    [] Progressing: [] Met: [x] Not Met: [] Adjusted     ASSESSMENT: Increased range of motion in isolated abduction this date after extensive time with inferior joint mobilizations. Noted increased pain free ROM after these motions as well as overall increased ROM. Followed with intermittent serratus press as well as AAROM into flexion. This is the most improvement that Paul has had in [address] [place] [address]. Continues to have soreness in supraspinatus. Edu on decreased soreness if impingement can resolve. Will cont with this plan. PN next week. Return to Play: (if applicable)   []  Stage 1: Intro to Strength   []  Stage 2: Dynamic Strength and Intro to Plyometrics   []  Stage 3: Advanced Plyometrics and Intro to Throwing   []  Stage 4: Sport specific Training/Return to Sport     []  Ready to Return to Play, [employer]   []  Not Ready for Return to Sports   Comments:      Treatment/Activity Tolerance:  [x] Patient tolerated treatment well [] Patient limited by fatique  [] Patient limited by pain  [] Patient limited by other medical complications  [] Other:     Overall Progression Towards Functional goals/ Treatment Progress Update:  [x] Patient is progressing as expected towards functional goals listed. [] Progression is slowed due to complexities/Impairments listed.   [] Progression has

## 2023-07-26 ENCOUNTER — HOSPITAL ENCOUNTER (OUTPATIENT)
Dept: PHYSICAL THERAPY | Age: 68
Setting detail: THERAPIES SERIES
Discharge: HOME | End: 2023-07-26
Payer: MEDICARE

## 2023-07-26 PROCEDURE — 97110 THERAPEUTIC EXERCISES: CPT

## 2023-07-26 PROCEDURE — 97140 MANUAL THERAPY 1/> REGIONS: CPT

## 2023-07-26 NOTE — FLOWSHEET NOTE
Progression has been slowed due to co-morbidities. [] Plan just implemented, too soon to assess goals progression <30days   [] Goals require adjustment due to lack of progress  [] Patient is not progressing as expected and requires additional follow up with physician  [] Other    Prognosis for POC: [x] Good [] Fair  [] Poor    Patient requires continued skilled intervention: [x] Yes  [] No      PLAN: 2x per week for 6-8 weeks  [x] Continue per plan of care [] Alter current plan (see comments)  [] Plan of care initiated [] Hold pending MD visit [] Discharge    Electronically signed by: Keri Jara, PT, DPT, MS    Note: If patient does not return for scheduled/recommended follow up visits, this note will serve as a discharge from care along with the most recent update on progress.

## 2023-07-28 ENCOUNTER — HOSPITAL ENCOUNTER (OUTPATIENT)
Dept: PHYSICAL THERAPY | Age: 68
Setting detail: THERAPIES SERIES
Discharge: HOME | End: 2023-07-28
Payer: MEDICARE

## 2023-07-28 PROCEDURE — 97110 THERAPEUTIC EXERCISES: CPT

## 2023-07-28 PROCEDURE — 97140 MANUAL THERAPY 1/> REGIONS: CPT

## 2023-07-28 NOTE — FLOWSHEET NOTE
[address], [address] [place] [address]  Phone: [phone] Fax: [phone]  Physical Therapy Treatment Note/ Progress Report:       Date:  2023    Patient Name:  Herminio Jordan    :  1955  MRN: [id number]  Restrictions/Precautions:    Medical/Treatment Diagnosis Information:  Diagnosis: M75.41 Impingement right shoulder, M75.21: Bicipital Tendinitis       ICD-10-CM    1. Right shoulder pain, unspecified chronicity  M25.511       2. Decreased ROM of right shoulder  M25.611       3. Decreased strength of upper extremity  R29.898           Insurance/Certification information:  PT Insurance Information: [id number] [place], [place]  Physician Information:  Frandy Mary DO  Plan of care signed (Y/N): yes    Date of Patient follow up with Physician:      Progress Report: []  Yes  [x]  No     Date Range for reporting period:  Beginnin2023  Ending: PN   POC     Progress report due (10 Rx/or 30 days whichever is less):      Recertification due (POC duration/ or 90 days whichever is less): 2023     Visit # Insurance Allowable Auth Needed   24 [place] []Yes    []No     Pain level:  -6/10     SUBJECTIVE: Patient states that she is feeling okay. Range continues to be restricted. She took a baseline blood glucose at 110. Functional Disability Index:QDASH: : 34%    Functional Disability Index:QDASH: : 16%   Functional Disability Index:QDASH: : 36%     OBJECTIVE: See eval  Observation:   Test measurements:   : Noted increased lymph node swelling under right armpit. : AC joint mobs no change, Inferior and posterior joint mobs no change. Scapular mobs hypomobile. STM with some changes to sensitivity at supraspinatus and biceps.      :             ROM PROM AROM  Comment     L R L R     Flexion    155 165 150 145 pre man    Abduction     170 155  170 after manual   ER     T6 T4

## 2023-08-01 ENCOUNTER — HOSPITAL ENCOUNTER (OUTPATIENT)
Dept: PHYSICAL THERAPY | Age: 68
Setting detail: THERAPIES SERIES
Discharge: HOME | End: 2023-08-01
Payer: MEDICARE

## 2023-08-01 PROCEDURE — 97140 MANUAL THERAPY 1/> REGIONS: CPT

## 2023-08-01 PROCEDURE — 97110 THERAPEUTIC EXERCISES: CPT

## 2023-08-01 PROCEDURE — 97530 THERAPEUTIC ACTIVITIES: CPT

## 2023-08-01 NOTE — FLOWSHEET NOTE
Adjusted  2. Patient will demonstrate increased AROM to Flexion 160, abd 150, IR to T12, ER to T4 to allow for proper joint functioning as indicated by patients Functional Deficits. [x] Progressing: [] Met: [] Not Met: [x] Adjusted Met 160 flexion this date. IR limited. 3. Patient will demonstrate an increase in right shoulder Strength to 5/5 to allow for proper functional mobility as indicated by patients Functional Deficits. [x] Progressing: [] Met: [] Not Met: [] Adjusted  4. Patient will return to raising arm overhead 2x10 functional activities without increased symptoms or restriction. [] Progressing: [x] Met: [] Not Met: [] Adjusted  5. Patient will return to swinging a golf club 1x10 times without increased symptoms or restriction. (patient specific functional goal)    [] Progressing: [] Met: [x] Not Met: [] Adjusted     ASSESSMENT: POC this date. Paul continues to demonstrate very slight improvements with therapy. She demonstrates improved functional ROM into shoulder flexion. (, 162 this date supine). Extensive inferior glides to gain back mobility to shoulder. Noted most improvements come from LLLD stretching into flexion. EDU on continuing these interventions at home. Would like to move to once a week with increase in HEP LLLD stretching to gain residual motion. Did note what seemed to be a swollen lymph node in armpit with STM. Paul was encouraged to make an appt for mammogram considering PMH breast cancer.      Return to Play: (if applicable)   []  Stage 1: Intro to Strength   []  Stage 2: Dynamic Strength and Intro to Plyometrics   []  Stage 3: Advanced Plyometrics and Intro to Throwing   []  Stage 4: Sport specific Training/Return to Sport     []  Ready to Return to Play, [employer]   []  Not Ready for Return to Sports   Comments:      Treatment/Activity Tolerance:  [x] Patient tolerated treatment well [] Patient limited by fatique  [] Patient limited by pain  [] Patient limited

## 2023-08-03 ENCOUNTER — HOSPITAL ENCOUNTER (OUTPATIENT)
Dept: PHYSICAL THERAPY | Age: 68
Setting detail: THERAPIES SERIES
Discharge: HOME | End: 2023-08-03
Payer: MEDICARE

## 2023-08-03 PROCEDURE — 97140 MANUAL THERAPY 1/> REGIONS: CPT

## 2023-08-03 PROCEDURE — 97110 THERAPEUTIC EXERCISES: CPT

## 2023-08-03 NOTE — FLOWSHEET NOTE
to T12, ER to T4 to allow for proper joint functioning as indicated by patients Functional Deficits. [x] Progressing: [] Met: [] Not Met: [x] Adjusted Met 160 flexion this date. IR limited. 3. Patient will demonstrate an increase in right shoulder Strength to 5/5 to allow for proper functional mobility as indicated by patients Functional Deficits. [x] Progressing: [] Met: [] Not Met: [] Adjusted  4. Patient will return to raising arm overhead 2x10 functional activities without increased symptoms or restriction. [] Progressing: [x] Met: [] Not Met: [] Adjusted  5. Patient will return to swinging a golf club 1x10 times without increased symptoms or restriction. (patient specific functional goal)    [] Progressing: [] Met: [x] Not Met: [] Adjusted     ASSESSMENT: Continued to note increases in flexion ROM with LLLD. Emphasized this intervention for HEP. Finished session with lat retraction with increased weight and time under tension. Will assess her response and tolerance with 1x per week at therapy. Overall, will cont to benefit from skilled PT interventions to progress to PLOF. Return to Play: (if applicable)   []  Stage 1: Intro to Strength   []  Stage 2: Dynamic Strength and Intro to Plyometrics   []  Stage 3: Advanced Plyometrics and Intro to Throwing   []  Stage 4: Sport specific Training/Return to Sport     []  Ready to Return to Play, [employer]   []  Not Ready for Return to Sports   Comments:      Treatment/Activity Tolerance:  [x] Patient tolerated treatment well [] Patient limited by fatique  [] Patient limited by pain  [] Patient limited by other medical complications  [] Other:     Overall Progression Towards Functional goals/ Treatment Progress Update:  [x] Patient is progressing as expected towards functional goals listed. [] Progression is slowed due to complexities/Impairments listed. [] Progression has been slowed due to co-morbidities.   [] Plan just implemented, too soon

## 2023-08-09 ENCOUNTER — HOSPITAL ENCOUNTER (OUTPATIENT)
Dept: PHYSICAL THERAPY | Age: 68
Setting detail: THERAPIES SERIES
Discharge: HOME | End: 2023-08-09
Payer: MEDICARE

## 2023-08-09 PROCEDURE — 97140 MANUAL THERAPY 1/> REGIONS: CPT

## 2023-08-09 PROCEDURE — 97110 THERAPEUTIC EXERCISES: CPT

## 2023-08-09 NOTE — FLOWSHEET NOTE
Slide 1 10    Wall [place] 1 10 Both ways   Forward flexion c retraction 1 10    Forward flexion 1 10 C scapular assist   Side lying chicken wing contract relax 3\" 10    Banded posterior glide 2 10    TRX walk out AAROM flexion 1 20    TRX serratus press 2 15    ER iso neutral 10\" 10 Red   Sleeper stretch 10\" 10 Just into restriction   Resisted Lat 2/5\" 20 green   Foam roller flexion with ER iso 2 10 red   Thoracic extension chair 1 20    Supine shoulder flexion  2/3\" 10 3 pound bar   Side lying abduction 2/3\" 10    Manual Intervention  (42609)      AC joint mobs  5' Improved ROM. Prone Hands on head inferior stretch  2'    STM   5' teres minor/ supraspinatus/subscap/ lat   Sleeper IR  3'    Shld /GH Mobs - supine, sidelying  20 min Inferior, posterior  Gr. II-III   Posterior capsule mobs - sidelying, prone  5 min Gr. II-III   Shoulder PROM/stretching  5 min    Scapular mobs- sidelying, supine 8  min Gr. II-III   Scapular mob with flexion ROM Sidelying 2' min    Prone thoracic PA 7' Improved pain free range   STM  7' Posterior RTC. Bicep         NMR re-education (96579)      Lower trap setting SB 1 20          Scapular retraction against towel.  10\" 10    Lat engagement in shoulder flex 2/5\" 10-15 3 plates   Lat engagement with shoulder ext 2 15    Sidelying serratus punch 3\" 15 Yellow sand ball   Sidelying short lever arm abd \"chicken wing\"  1 15 Cues for scap depression as she moves [place]   Scapular squeeze sitting Prone I 3\" 10    Prone Y 3\" 10    Diagonal Y supine 3\" 20 Green HEP   Supine Horizontal abduction 10\" 15 Green   Standing Resisted Extension 2/5\" 10 Black   Standing Resisted Row 2/5\" 10 Black   Prone extension 5\" 15 5#   Prone scapular retraction 5\" 15 3#   Standing TB serratus punch - 2 plates 2 10 Needs significant tactile cueing to avoid weight shift compensation, keep elbow straight   Quadruped scap protraction/retraction 2 10 Table 6/16         Therapeutic Activity (50953)      BP Assessment  8

## 2023-08-16 ENCOUNTER — HOSPITAL ENCOUNTER (OUTPATIENT)
Dept: PHYSICAL THERAPY | Age: 68
Setting detail: THERAPIES SERIES
Discharge: HOME | End: 2023-08-16
Payer: MEDICARE

## 2023-08-16 PROCEDURE — 97140 MANUAL THERAPY 1/> REGIONS: CPT

## 2023-08-16 PROCEDURE — 97110 THERAPEUTIC EXERCISES: CPT

## 2023-08-16 NOTE — FLOWSHEET NOTE
function. [x] Progressing: [] Met: [] Not Met: [] Adjusted  2. Patient will demonstrate increased AROM to Flexion 160, abd 150, IR to T12, ER to T4 to allow for proper joint functioning as indicated by patients Functional Deficits. [x] Progressing: [] Met: [] Not Met: [x] Adjusted Met 160 flexion this date. IR limited. 3. Patient will demonstrate an increase in right shoulder Strength to 5/5 to allow for proper functional mobility as indicated by patients Functional Deficits. [x] Progressing: [] Met: [] Not Met: [] Adjusted  4. Patient will return to raising arm overhead 2x10 functional activities without increased symptoms or restriction. [] Progressing: [x] Met: [] Not Met: [] Adjusted  5. Patient will return to swinging a golf club 1x10 times without increased symptoms or restriction. (patient specific functional goal)    [] Progressing: [] Met: [x] Not Met: [] Adjusted     ASSESSMENT: Continued with interventions from last session. She has had slight regression of ROM at presentation that was gained back with interventions this date. Continue to stress HEP to maintain ROM. Continue to re-educating Paul on impingement and supraspinatus pain as secondary to impingement. Improvements in ROM after abd contract relax coordinated with inferior joint mob this date. Will cont to benefit from skilled PT services 1x/week.      Return to Play: (if applicable)   []  Stage 1: Intro to Strength   []  Stage 2: Dynamic Strength and Intro to Plyometrics   []  Stage 3: Advanced Plyometrics and Intro to Throwing   []  Stage 4: Sport specific Training/Return to Sport     []  Ready to Return to Play, [employer]   []  Not Ready for Return to Sports   Comments:      Treatment/Activity Tolerance:  [x] Patient tolerated treatment well [] Patient limited by fatique  [] Patient limited by pain  [] Patient limited by other medical complications  [] Other:     Overall Progression Towards Functional goals/ Treatment

## 2023-08-23 ENCOUNTER — APPOINTMENT (OUTPATIENT)
Dept: PHYSICAL THERAPY | Age: 68
End: 2023-08-23
Payer: MEDICARE

## 2023-08-30 ENCOUNTER — HOSPITAL ENCOUNTER (OUTPATIENT)
Dept: PHYSICAL THERAPY | Age: 68
Setting detail: THERAPIES SERIES
Discharge: HOME | End: 2023-08-30
Payer: MEDICARE

## 2023-08-30 PROCEDURE — 97140 MANUAL THERAPY 1/> REGIONS: CPT

## 2023-08-30 PROCEDURE — 97110 THERAPEUTIC EXERCISES: CPT

## 2023-08-30 PROCEDURE — 97530 THERAPEUTIC ACTIVITIES: CPT

## 2023-09-06 ENCOUNTER — HOSPITAL ENCOUNTER (OUTPATIENT)
Dept: PHYSICAL THERAPY | Age: 68
Setting detail: THERAPIES SERIES
Discharge: HOME | End: 2023-09-06
Payer: MEDICARE

## 2023-09-06 PROCEDURE — 97110 THERAPEUTIC EXERCISES: CPT

## 2023-09-06 PROCEDURE — 97112 NEUROMUSCULAR REEDUCATION: CPT

## 2023-09-06 NOTE — FLOWSHEET NOTE
listed. [] Progression is slowed due to complexities/Impairments listed. [] Progression has been slowed due to co-morbidities. [] Plan just implemented, too soon to assess goals progression <30days   [] Goals require adjustment due to lack of progress  [] Patient is not progressing as expected and requires additional follow up with physician  [] Other    Prognosis for POC: [x] Good [] Fair  [] Poor    Patient requires continued skilled intervention: [x] Yes  [] No      PLAN: 2x per week for 6-8 weeks  [x] Continue per plan of care [] Alter current plan (see comments)  [] Plan of care initiated [] Hold pending MD visit [] Discharge    Electronically signed by: Nadeem Tan PT, DPT, MS    Note: If patient does not return for scheduled/recommended follow up visits, this note will serve as a discharge from care along with the most recent update on progress.

## 2023-09-13 ENCOUNTER — HOSPITAL ENCOUNTER (OUTPATIENT)
Dept: PHYSICAL THERAPY | Age: 68
Setting detail: THERAPIES SERIES
Discharge: HOME | End: 2023-09-13
Payer: MEDICARE

## 2023-09-13 PROCEDURE — 97110 THERAPEUTIC EXERCISES: CPT

## 2023-09-13 PROCEDURE — 97140 MANUAL THERAPY 1/> REGIONS: CPT

## 2023-09-13 NOTE — FLOWSHEET NOTE
[address], [address] [place] [address]  Phone: [phone] Fax: [phone]       Physical Therapy Treatment Note/ Progress Report:       Date:  2023    Patient Name:  Jes Pollock    :  1955  MRN: [id number]  Restrictions/Precautions:    Medical/Treatment Diagnosis Information:  Diagnosis: M75.41 Impingement right shoulder, M75.21: Bicipital Tendinitis       ICD-10-CM    1. Right shoulder pain, unspecified chronicity  M25.511       2. Decreased ROM of right shoulder  M25.611       3. Decreased strength of upper extremity  R29.898           Insurance/Certification information:  PT Insurance Information: [id number] [place], [place]  Physician Information:  Lorena Feng DO  Plan of care signed (Y/N): yes    Date of Patient follow up with Physician:      Progress Report: []  Yes  [x]  No     Date Range for reporting period:  Beginnin2023  Ending: PN   POC   POC 2023  POC     Progress report due (10 Rx/or 30 days whichever is less): 4513     Recertification due (POC duration/ or 90 days whichever is less): 2023     Visit # Insurance Allowable Auth Needed   31 [place] []Yes    []No     Pain level:  0/10     SUBJECTIVE: Patient states that she has not contacted Dr. Lizzy Bradshaw. She states that she continues to have similar symptoms. Functional Disability Index:QDASH: : 34%    Functional Disability Index:QDASH: : 16%   Functional Disability Index:QDASH: : 36%   Functional Disability Index:QDASH: : 29%     Functional Disability Index:QDASH: : 20%     OBJECTIVE: See eval  Observation:   Test measurements:  : 150 in standing (presented with 140)    : 155 supine flexion    : Noted increased lymph node swelling under right armpit. : AC joint mobs no change, Inferior and posterior joint mobs no change. Scapular mobs hypomobile.  STM with some changes

## 2023-09-20 ENCOUNTER — APPOINTMENT (OUTPATIENT)
Dept: PHYSICAL THERAPY | Age: 68
End: 2023-09-20
Payer: MEDICARE

## 2024-03-04 ENCOUNTER — HOSPITAL ENCOUNTER (OUTPATIENT)
Dept: PHYSICAL THERAPY | Age: 69
Setting detail: THERAPIES SERIES
Discharge: HOME | End: 2024-03-04
Payer: MEDICARE

## 2024-03-04 DIAGNOSIS — M25.652 DECREASED RANGE OF MOTION OF BOTH HIPS: ICD-10-CM

## 2024-03-04 DIAGNOSIS — R29.898 DECREASED STRENGTH OF LOWER EXTREMITY: ICD-10-CM

## 2024-03-04 DIAGNOSIS — M25.651 DECREASED RANGE OF MOTION OF BOTH HIPS: ICD-10-CM

## 2024-03-04 DIAGNOSIS — M25.551 RIGHT HIP PAIN: Primary | ICD-10-CM

## 2024-03-04 PROCEDURE — 97530 THERAPEUTIC ACTIVITIES: CPT

## 2024-03-04 PROCEDURE — 97161 PT EVAL LOW COMPLEX 20 MIN: CPT

## 2024-03-04 PROCEDURE — 97110 THERAPEUTIC EXERCISES: CPT

## 2024-03-04 NOTE — PLAN OF CARE
NA  Other:    Red Flags:  None    C-SSRS Triggered by Intake questionnaire:   [x] No, Questionnaire did not trigger screening.   [] Yes, Patient intake triggered further evaluation      [] C-SSRS Screening completed  [] PCP notified via Plan of Care  [] Emergency services notified     Preferred Language for Healthcare:   [x] English       [] other:    SUBJECTIVE EXAMINATION     Patient stated complaint: She states that the first steps that she takes are painful after getting up. She states that she also cannot go up the stairs with this leg. She has been taking exercise classes at the [facility] - similar to chair yoga that includes kicking jumping, stretching. She has had this pain for >6 mo. She would like to be able to progress these activities but her knee and hip are limiting her. She does have some pain with hip abduction in standing. The pain is primarily in the right hip. She has had to go upstairs in step to pattern with left foot leading.     Is taking ozempic for prediabetes. She does report falling two years ago onto this right hip.       Test used Initial score  3/4/24 03/04/2024   Pain Summary VAS 7-8/10    Functional questionnaire LEFS 50/80; 37.5%    Other:              Pain:  Pain location: Anterior hip.   Patient describes pain to be intermittent and Sharp however is present every time she performs movements  Pain decreases with: Resting  Pain increases with: Activity and Movement, Running, and stairs     Relevant Medical History: R shoulder RTC tear, pacemaker    Occupation/School:  Work/School Status: Retired  Job Duties/Demands: NA    Sport/ Recreation/ Leisure/ Hobbies: Exercise classes, golf    Review Of Systems (ROS):  [x] Performed Review of systems (Integumentary, CardioPulmonary, Neurological) by intake and observation. Intake form has been scanned into medical record. Patient has been instructed to contact their primary care physician regarding ROS issues if not already being addressed at this

## 2024-03-06 ENCOUNTER — HOSPITAL ENCOUNTER (OUTPATIENT)
Dept: PHYSICAL THERAPY | Age: 69
Setting detail: THERAPIES SERIES
Discharge: HOME | End: 2024-03-06
Payer: MEDICARE

## 2024-03-06 PROCEDURE — 97140 MANUAL THERAPY 1/> REGIONS: CPT

## 2024-03-06 PROCEDURE — 97530 THERAPEUTIC ACTIVITIES: CPT

## 2024-03-06 PROCEDURE — 97110 THERAPEUTIC EXERCISES: CPT

## 2024-03-06 NOTE — FLOWSHEET NOTE
[facility] - Outpatient Rehabilitation and Therapy [address] [place] [address] office: [phone] fax: [phone]     Physical Therapy Initial Evaluation Certification          Physical Therapy: TREATMENT/PROGRESS NOTE   Patient: Kenisha Santiago (69 y.o. female)   Examination Date: 2024   :  1955 MRN: [id number]   Visit #: 2   Insurance Allowable Auth Needed   MN []Yes    [x]No    Insurance: Payor: AETNA MEDICARE / Plan: AETNA MEDICARE-ADVANTAGE PPO / Product Type: Medicare /   Insurance ID: [id number] - (Medicare Managed)  Secondary Insurance (if applicable):    Treatment Diagnosis:     ICD-10-CM    1. Right hip pain  M25.551       2. Decreased strength of lower extremity  R29.898       3. Decreased range of motion of both hips  M25.651     M25.652          Medical Diagnosis:  Iliotibial band syndrome, right leg [M76.31]   Referring Physician: Sandhya Quinonez MD  PCP: Sandhya Quinonez       Plan of care signed (Y/N):     Date of Patient follow up with Physician:      Progress Report/POC:   POC update due: (10 visits /OR AUTH LIMITS, whichever is less)  4/3/2024                                             Precautions/ Contra-indications:           Latex allergy:  NO  Pacemaker:    YES  Contraindications for Manipulation: osteoporosis   Date of Surgery: NA  Other:    Red Flags:  None    C-SSRS Triggered by Intake questionnaire:   [x] No, Questionnaire did not trigger screening.   [] Yes, Patient intake triggered further evaluation      [] C-SSRS Screening completed  [] PCP notified via Plan of Care  [] Emergency services notified     Preferred Language for Healthcare:   [x] English       [] other:    SUBJECTIVE EXAMINATION     Patient stated complaint: She states that she was sore for a day. Is feeling okay today. Did not do the exercises because of the soreness.     Is taking ozempic for prediabetes. She does report falling two years ago onto this right hip.

## 2024-03-12 ENCOUNTER — HOSPITAL ENCOUNTER (OUTPATIENT)
Dept: PHYSICAL THERAPY | Age: 69
Setting detail: THERAPIES SERIES
Discharge: HOME | End: 2024-03-12
Payer: MEDICARE

## 2024-03-12 PROCEDURE — 97110 THERAPEUTIC EXERCISES: CPT

## 2024-03-12 PROCEDURE — 97530 THERAPEUTIC ACTIVITIES: CPT

## 2024-03-12 PROCEDURE — 97140 MANUAL THERAPY 1/> REGIONS: CPT

## 2024-03-12 NOTE — FLOWSHEET NOTE
Poor    Patient requires continued skilled intervention: [x] Yes  [] No      CHARGE CAPTURE     PT CHARGE GRID   CPT Code (TIMED) minutes # CPT Code (UNTIMED) #     Therex (18877)  12 1  EVAL:LOW (91423 - Typically 20 minutes face-to-face)     Neuromusc. Re-ed (82601)    Re-Eval (21369)     Manual (14293) 19 1  Estim Unattended (63397)     Ther. Act (17423) 10' 1  Mech. Traction (66162)     Gait (71928)    Dry Needle 1-2 muscle (20560)     Aquatic Therex (16544)    Dry Needle 3+ muscle (20561)     Iontophoresis (40737)    VASO (29365)     Ultrasound (09411)    Group Therapy (10067)     Estim Attended (18433)    Canalith Repositioning (11771)     Other:    Other:    Total Timed Code Tx Minutes 41 3       Total Treatment Minutes 41        Charge Justification:  (64283) THERAPEUTIC EXERCISE - Provided verbal/tactile cueing for activities related to strengthening, flexibility, endurance, ROM performed to prevent loss of range of motion, maintain or improve muscular strength or increase flexibility, following either an injury or surgery.   (86043) HOME EXERCISE PROGRAM - Reviewed/Progressed HEP activities related to strengthening, flexibility, endurance, ROM performed to prevent loss of range of motion, maintain or improve muscular strength or increase flexibility, following either an injury or surgery.  (45152) NEUROMUSCULAR RE-EDUCATION - Therapeutic procedure, 1 or more areas, each 15 minutes; neuromuscular reeducation of movement, balance, coordination, kinesthetic sense, posture, and/or proprioception for sitting and/or standing activities  (07801) HOME EXERCISE PROGRAM - Reviewed/Progressed HEP activities related to neuromuscular reeducation of movement, balance, coordination, kinesthetic sense, posture, and/or proprioception for sitting and/or standing activities    (16193) THERAPEUTIC ACTIVITY - use of dynamic activities to improve functional performance. (Ex include squatting, ascending/descending stairs,

## 2024-03-14 ENCOUNTER — HOSPITAL ENCOUNTER (OUTPATIENT)
Dept: PHYSICAL THERAPY | Age: 69
Setting detail: THERAPIES SERIES
Discharge: HOME | End: 2024-03-14
Payer: MEDICARE

## 2024-03-14 PROCEDURE — 97140 MANUAL THERAPY 1/> REGIONS: CPT

## 2024-03-14 PROCEDURE — 97530 THERAPEUTIC ACTIVITIES: CPT

## 2024-03-14 PROCEDURE — 97110 THERAPEUTIC EXERCISES: CPT

## 2024-03-14 NOTE — FLOWSHEET NOTE
[facility] - Outpatient Rehabilitation and Therapy [address] [place] [address] office: [phone] fax: [phone]     Physical Therapy Initial Evaluation Certification          Physical Therapy: TREATMENT/PROGRESS NOTE   Patient: Kenisha Santiago (69 y.o. female)   Examination Date: 2024   :  1955 MRN: [id number]   Visit #: 4   Insurance Allowable Auth Needed   MN []Yes    [x]No    Insurance: Payor: AETNA MEDICARE / Plan: AETNA MEDICARE-ADVANTAGE PPO / Product Type: Medicare /   Insurance ID: [id number] - (Medicare Managed)  Secondary Insurance (if applicable):    Treatment Diagnosis:     ICD-10-CM    1. Right hip pain  M25.551       2. Decreased strength of lower extremity  R29.898       3. Decreased range of motion of both hips  M25.651     M25.652          Medical Diagnosis:  Iliotibial band syndrome, right leg [M76.31]   Referring Physician: Sandhya Quinonez MD  PCP: Sandhya Quinonez       Plan of care signed (Y/N):     Date of Patient follow up with Physician:      Progress Report/POC:   POC update due: (10 visits /OR AUTH LIMITS, whichever is less)  4/3/2024                                             Precautions/ Contra-indications:           Latex allergy:  NO  Pacemaker:    YES  Contraindications for Manipulation: osteoporosis   Date of Surgery: NA  Other:    Red Flags:  None    C-SSRS Triggered by Intake questionnaire:   [x] No, Questionnaire did not trigger screening.   [] Yes, Patient intake triggered further evaluation      [] C-SSRS Screening completed  [] PCP notified via Plan of Care  [] Emergency services notified     Preferred Language for Healthcare:   [x] English       [] other:    SUBJECTIVE EXAMINATION     Patient stated complaint: She states that she woke up this morning with pain down her leg and into her ankle. She states that she is going on a trip next week. Hip cont to bother her with stairs and the first step standing up after sitting fort

## 2024-03-26 ENCOUNTER — HOSPITAL ENCOUNTER (OUTPATIENT)
Dept: PHYSICAL THERAPY | Age: 69
Setting detail: THERAPIES SERIES
Discharge: HOME | End: 2024-03-26
Payer: MEDICARE

## 2024-03-26 PROCEDURE — 97140 MANUAL THERAPY 1/> REGIONS: CPT

## 2024-03-26 PROCEDURE — 97110 THERAPEUTIC EXERCISES: CPT

## 2024-03-26 NOTE — FLOWSHEET NOTE
[facility] - Outpatient Rehabilitation and Therapy [address] [place] [address] office: [phone] fax: [phone]     Physical Therapy Initial Evaluation Certification          Physical Therapy: TREATMENT/PROGRESS NOTE   Patient: Kenisha Santiago (69 y.o. female)   Examination Date: 2024   :  1955 MRN: [id number]   Visit #: 5   Insurance Allowable Auth Needed   MN []Yes    [x]No    Insurance: Payor: AETNA MEDICARE / Plan: AETNA MEDICARE-ADVANTAGE PPO / Product Type: Medicare /   Insurance ID: [id number] - (Medicare Managed)  Secondary Insurance (if applicable):    Treatment Diagnosis:     ICD-10-CM    1. Right hip pain  M25.551       2. Decreased strength of lower extremity  R29.898       3. Decreased range of motion of both hips  M25.651     M25.652          Medical Diagnosis:  Iliotibial band syndrome, right leg [M76.31]   Referring Physician: Sandhya Quinonez MD  PCP: Sandhya Quinonez       Plan of care signed (Y/N):     Date of Patient follow up with Physician:      Progress Report/POC:   POC update due: (10 visits /OR AUTH LIMITS, whichever is less)  4/3/2024                                             Precautions/ Contra-indications:           Latex allergy:  NO  Pacemaker:    YES  Contraindications for Manipulation: osteoporosis   Date of Surgery: NA  Other:    Red Flags:  None    C-SSRS Triggered by Intake questionnaire:   [x] No, Questionnaire did not trigger screening.   [] Yes, Patient intake triggered further evaluation      [] C-SSRS Screening completed  [] PCP notified via Plan of Care  [] Emergency services notified     Preferred Language for Healthcare:   [x] English       [] other:    SUBJECTIVE EXAMINATION     Patient stated complaint: She states that she walked on the sand at the beach and did okay with this. She stated that it was difficult.     Is taking ozempic for prediabetes. She does report falling two years ago onto this right hip.       Test

## 2024-03-28 ENCOUNTER — HOSPITAL ENCOUNTER (OUTPATIENT)
Dept: PHYSICAL THERAPY | Age: 69
Setting detail: THERAPIES SERIES
Discharge: HOME | End: 2024-03-28
Payer: MEDICARE

## 2024-03-28 PROCEDURE — 97110 THERAPEUTIC EXERCISES: CPT

## 2024-03-28 PROCEDURE — 97530 THERAPEUTIC ACTIVITIES: CPT

## 2024-03-28 NOTE — FLOWSHEET NOTE
Falls Risk assessed and no intervention required.  Time Up and Go (TUG):   Not Assessed        Exercises/Interventions     Therapeutic Ex (98952)  resistance Sets/time Reps Notes/Cues/Progressions   Bike       Hip IR red 2 15 Hit 15 then compensation   Hip ER Hold  Yellow HC      SLR  2 15    Hip abd    After Quad stretch          Hip abd c bridge red HC 3/2\" 8-12 PPT   BOSU stomp lateral Yellow MB press      Posterolateral hip stretch  1' 2 Bilateral   Hip extension 3# 3 20 Bilateral   Prone quad stretch  1' 3 Bilateral   Side stepping straight leg/bent leg Yellow HC 2 20' Discontinued          Manual Intervention (61215)  TIME     STM glute min/med/piriformis       Hip mobs    Anterior   IASTM    Posterior hip   PA spine    No change in symptoms.           NMR re-education (35816) resistance Sets/time Reps CUES NEEDED                                      Therapeutic Activity (20496)  Sets/time     Edu: ITB versus hip flexor. Related to proximal hip weakness.    Intermittent   Posterior slider  2 10 Bilateral  Cues for anterior tibial translation  Hip flexor ecc   Posterior step down 4\" 2 10 Pain down leg into anterior shin.    Lateral step down 6\"      Split squat c Band Cue    Cues to sink down as opposed to forward.   Assess: Spine, hip scour, neural tension         Modalities:    No modalities applied this session    Education/Home Exercise Program: Patient HEP program created electronically.  Refer to [employer] access code: Access Code: [email or web address]        ASSESSMENT   Today's Assessment:  Paul reports feeling better after sessions however she continues to have difficulty with stairs (only up). This session continued to work through posterolateral hip strengthening in [facility] and [place]. Paul reports burning down the lateral side of her thigh and into her lower leg with posterior step down. Added hip stretching to protocol and continued to work through anterior hip extensibility for appropriate recruitment of posterior

## 2024-04-02 ENCOUNTER — HOSPITAL ENCOUNTER (OUTPATIENT)
Dept: PHYSICAL THERAPY | Age: 69
Setting detail: THERAPIES SERIES
Discharge: HOME | End: 2024-04-02
Payer: MEDICARE

## 2024-04-02 PROCEDURE — 97110 THERAPEUTIC EXERCISES: CPT

## 2024-04-02 PROCEDURE — 97140 MANUAL THERAPY 1/> REGIONS: CPT

## 2024-04-02 NOTE — FLOWSHEET NOTE
Adjusted  Patient will have a decrease in pain to 0/10 to help facilitate improvement in movement, function, and ADLs as indicated by functional deficits.   Status: [] Progressing: [] Met: [] Not Met: [] Adjusted    Long Term Goals: To be achieved in: 8-10 weeks  Disability index score of 10% or less for the LEFS to assist with return top prior level of function.   Status: [] Progressing: [] Met: [] Not Met: [] Adjusted  Improve hip AROM to 10 degrees or  better  (extension) to allow for proper joint functioning as indicated by patients functional deficits.  Status: [] Progressing: [] Met: [] Not Met: [] Adjusted  Pt to improve strength to 4+/5 or better of proximal hip to allow for proper muscle and joint use in functional mobility, ADLs and prior level of function   Status: [] Progressing: [] Met: [] Not Met: [] Adjusted  Patient will return to  Usual work, housework or activities, Usual recreational activities, and walk 1 mile  without increased symptoms or restriction to work towards return to prior level of function.        Status: [] Progressing: [] Met: [] Not Met: [] Adjusted  The patient will return to full recreational activities including exercise classes for her cardiac health without limitations from hip or knee pain in order to continue a healthy and preventative lifestyle.             Status: [] Progressing: [] Met: [] Not Met: [] Adjusted    TREATMENT PLAN     Frequency/Duration: 1-2x/week for 8-10 weeks for the following treatment interventions:    Interventions:  [x] Therapeutic exercise including: strength training, ROM, including postural re-education.   [x] NMR activation and proprioception, including postural re-education.    [x] Manual therapy as indicated to include: PROM, Gr I-IV mobilizations, and STM  [x] Modalities as needed that may include: NONE  [x] Patient education on joint protection, postural re-education, activity modification, progression of HEP.        [] Aquatic

## 2024-04-04 ENCOUNTER — APPOINTMENT (OUTPATIENT)
Dept: PHYSICAL THERAPY | Age: 69
End: 2024-04-04
Payer: MEDICARE

## 2024-04-09 ENCOUNTER — APPOINTMENT (OUTPATIENT)
Dept: PHYSICAL THERAPY | Age: 69
End: 2024-04-09
Payer: MEDICARE

## 2024-04-11 ENCOUNTER — HOSPITAL ENCOUNTER (OUTPATIENT)
Dept: PHYSICAL THERAPY | Age: 69
Setting detail: THERAPIES SERIES
Discharge: HOME | End: 2024-04-11
Payer: MEDICARE

## 2024-04-11 PROCEDURE — 97530 THERAPEUTIC ACTIVITIES: CPT

## 2024-04-11 PROCEDURE — 97110 THERAPEUTIC EXERCISES: CPT

## 2024-04-11 NOTE — PLAN OF CARE
limitations from hip or knee pain in order to continue a healthy and preventative lifestyle.             Status: [x] Progressing: [] Met: [] Not Met: [] Adjusted    TREATMENT PLAN     Frequency/Duration: 1-2x/week for 8-10 weeks for the following treatment interventions:    Interventions:  [x] Therapeutic exercise including: strength training, ROM, including postural re-education.   [x] NMR activation and proprioception, including postural re-education.    [x] Manual therapy as indicated to include: PROM, Gr I-IV mobilizations, and STM  [x] Modalities as needed that may include: NONE  [x] Patient education on joint protection, postural re-education, activity modification, progression of HEP.        [] Aquatic Therapy    Plan: POC initiated as per evaluation    Electronically Signed by Betito Del Cid PT DPT, MS Date: 04/11/2024     Note: Portions of this note have been templated and/or copied from initial evaluation, reassessments and prior notes for documentation efficiency.

## 2024-04-16 ENCOUNTER — HOSPITAL ENCOUNTER (OUTPATIENT)
Dept: PHYSICAL THERAPY | Age: 69
Setting detail: THERAPIES SERIES
Discharge: HOME | End: 2024-04-16
Payer: MEDICARE

## 2024-04-16 PROCEDURE — 97110 THERAPEUTIC EXERCISES: CPT

## 2024-04-18 ENCOUNTER — APPOINTMENT (OUTPATIENT)
Dept: PHYSICAL THERAPY | Age: 69
End: 2024-04-18
Payer: MEDICARE

## 2024-04-23 ENCOUNTER — HOSPITAL ENCOUNTER (OUTPATIENT)
Dept: PHYSICAL THERAPY | Age: 69
Setting detail: THERAPIES SERIES
Discharge: HOME | End: 2024-04-23
Payer: MEDICARE

## 2024-04-23 PROCEDURE — 97140 MANUAL THERAPY 1/> REGIONS: CPT

## 2024-04-23 PROCEDURE — 97530 THERAPEUTIC ACTIVITIES: CPT

## 2024-04-23 PROCEDURE — 97110 THERAPEUTIC EXERCISES: CPT

## 2024-04-23 NOTE — FLOWSHEET NOTE
[facility] - Outpatient Rehabilitation and Therapy [address] [place] [address] office: [phone] fax: [phone]      Physical Therapy: TREATMENT/PROGRESS NOTE   Patient: Kenisha Santiago (69 y.o. female)   Examination Date: 2024   :  1955 MRN: [id number]   Visit #: 10   Insurance Allowable Auth Needed   MN []Yes    [x]No    Insurance: Payor: AETNA MEDICARE / Plan: AETNA MEDICARE-ADVANTAGE PPO / Product Type: Medicare /   Insurance ID: [id number] - (Medicare Managed)  Secondary Insurance (if applicable):    Treatment Diagnosis:     ICD-10-CM    1. Right hip pain  M25.551       2. Decreased strength of lower extremity  R29.898       3. Decreased range of motion of both hips  M25.651     M25.652          Medical Diagnosis:  Iliotibial band syndrome, right leg [M76.31]   Referring Physician: Sandhya Quinonez MD  PCP: Sandhya Quinonez       Plan of care signed (Y/N):     Date of Patient follow up with Physician:      Progress Report/POC:   POC update due: (10 visits /OR AUTH LIMITS, whichever is less)  4/3/2024                                             Precautions/ Contra-indications:           Latex allergy:  NO  Pacemaker:    YES  Contraindications for Manipulation: osteoporosis   Date of Surgery: NA  Other:    Red Flags:  None    C-SSRS Triggered by Intake questionnaire:   [x] No, Questionnaire did not trigger screening.   [] Yes, Patient intake triggered further evaluation      [] C-SSRS Screening completed  [] PCP notified via Plan of Care  [] Emergency services notified     Preferred Language for Healthcare:   [x] English       [] other:    SUBJECTIVE EXAMINATION     Patient stated complaint: Paul states that she has had no pain today. She reports that she did not got to her exercise class last week. She has been doing the eccentric hip flexion exercise, the bridge, the sliders. She has not done the quad stretch. She states that she is better 60% of the days.

## 2024-04-25 ENCOUNTER — HOSPITAL ENCOUNTER (OUTPATIENT)
Dept: PHYSICAL THERAPY | Age: 69
Setting detail: THERAPIES SERIES
Discharge: HOME | End: 2024-04-25
Payer: MEDICARE

## 2024-04-25 PROCEDURE — 97110 THERAPEUTIC EXERCISES: CPT

## 2024-04-25 PROCEDURE — 97530 THERAPEUTIC ACTIVITIES: CPT

## 2024-04-25 PROCEDURE — 97140 MANUAL THERAPY 1/> REGIONS: CPT

## 2024-04-25 NOTE — FLOWSHEET NOTE
[facility] - Outpatient Rehabilitation and Therapy [address] [place] [address] office: [phone] fax: [phone]      Physical Therapy: TREATMENT/PROGRESS NOTE   Patient: Kenisha Santiago (69 y.o. female)   Examination Date: 2024   :  1955 MRN: [id number]   Visit #: 11   Insurance Allowable Auth Needed   MN []Yes    [x]No    Insurance: Payor: AETNA MEDICARE / Plan: AETNA MEDICARE-ADVANTAGE PPO / Product Type: Medicare /   Insurance ID: [id number] - (Medicare Managed)  Secondary Insurance (if applicable):    Treatment Diagnosis:     ICD-10-CM    1. Right hip pain  M25.551       2. Decreased strength of lower extremity  R29.898       3. Decreased range of motion of both hips  M25.651     M25.652          Medical Diagnosis:  Iliotibial band syndrome, right leg [M76.31]   Referring Physician: Sandhya Quinonez MD  PCP: Sandhya Quinonez MD       Plan of care signed (Y/N):     Date of Patient follow up with Physician:      Progress Report/POC:   POC update due: (10 visits /OR AUTH LIMITS, whichever is less)  4/3/2024                                             Precautions/ Contra-indications:           Latex allergy:  NO  Pacemaker:    YES  Contraindications for Manipulation: osteoporosis   Date of Surgery: NA  Other:    Red Flags:  None    C-SSRS Triggered by Intake questionnaire:   [x] No, Questionnaire did not trigger screening.   [] Yes, Patient intake triggered further evaluation      [] C-SSRS Screening completed  [] PCP notified via Plan of Care  [] Emergency services notified     Preferred Language for Healthcare:   [x] English       [] other:    SUBJECTIVE EXAMINATION     Patient stated complaint: Paul states that she was sore for the remainder of the day last session. She states that she took an ibuprofen.        Test used Initial score  3/4/24 2024   Pain Summary VAS 7-8/10 0 at rest   Functional questionnaire LEFS 50/80; 37.5% 64;20%   Other:

## 2024-04-30 ENCOUNTER — HOSPITAL ENCOUNTER (OUTPATIENT)
Dept: PHYSICAL THERAPY | Age: 69
Setting detail: THERAPIES SERIES
Discharge: HOME | End: 2024-04-30
Payer: MEDICARE

## 2024-04-30 PROCEDURE — 97140 MANUAL THERAPY 1/> REGIONS: CPT

## 2024-04-30 PROCEDURE — 97110 THERAPEUTIC EXERCISES: CPT

## 2024-04-30 NOTE — FLOWSHEET NOTE
extended periods of time. Other than that things are hit or miss.        Test used Initial score  3/4/24 04/30/2024   Pain Summary VAS 7-8/10 0 at rest   Functional questionnaire LEFS 50/80; 37.5% 64;20%   Other:              Objective:   3/14/24: Lumbar compression negative, negative straight leg raise, negative compression and distraction for SI, negative PA sacrum. Negative repeated movements, negative hip scour    OBJECTIVE EXAMINATION     3/4/24  ROM/Strength: (Blank cells denote NT)      Mvmt (norm) AROM L AROM R Notes PROM L PROM R Notes               LUMBAR Flex (90)         Ext (25)         SB (25)          Rotation (30)                       HIP Flex (120) 110 110        Abd (45)          ER (50) 50 50        IR (45) 35 35        Ext (20) 5 5                 KNEE Flex (140) WFL WFL        Ext (0) WFL WFL                   ANKLE DF (20)          PF (50)          Inversion (30)          Eversion (20)             MMT L          MMT  R Notes     LUMBAR  Flexion       Extension       Lateral flexion        Rotation          MMT L MMT R Notes       HIP  Flexion 5 4 Pain     Abduction 4 4      ER 5 5      IR 5 4      Extension             KNEE  Flexion 4+ 4+      Extension 5 5             ANKLE  DF        PF        Inversion        Eversion      Repeated Movements: [] Normal  [] Abnormal [x] N/A    Palpation:   Patient reported tenderness with palpation  Location:Deep rotators of the hip, TFL, Vastus lateralis.     Posture:   WNL    Bandages/Dressings/Incisions:  Not Applicable    Dermatomes: Abnormal findings listed below  All WNL    Myotomes: Abnormal findings listed below  All WNL    Reflexes: Abnormal findings listed below  All reflexes WNL (2+)    Specific Joint Mobility Testing/Accessory Motions:      Hip:  WNL hypomobile ant capsule    Special Tests:  FADIR (MANUEL/LABRAL/PSOAS): Negative  Jamie's test (IT Band): Positive.     Gait:    Pattern: antalgic pattern  Assistive Device Used: no AD    Balance:  [x]

## 2024-05-02 ENCOUNTER — APPOINTMENT (OUTPATIENT)
Dept: PHYSICAL THERAPY | Age: 69
End: 2024-05-02
Payer: MEDICARE

## 2024-05-07 ENCOUNTER — APPOINTMENT (OUTPATIENT)
Dept: PHYSICAL THERAPY | Age: 69
End: 2024-05-07
Payer: MEDICARE

## 2024-05-14 ENCOUNTER — HOSPITAL ENCOUNTER (OUTPATIENT)
Dept: PHYSICAL THERAPY | Age: 69
Setting detail: THERAPIES SERIES
Discharge: HOME | End: 2024-05-14
Payer: MEDICARE

## 2024-05-14 PROCEDURE — 97140 MANUAL THERAPY 1/> REGIONS: CPT

## 2024-05-14 PROCEDURE — 97530 THERAPEUTIC ACTIVITIES: CPT

## 2024-05-14 PROCEDURE — 97110 THERAPEUTIC EXERCISES: CPT

## 2024-05-14 NOTE — PLAN OF CARE
Physical Therapy Re-Certification Plan of Care/MD UPDATE      Dear Cristiano Rene MD ,    We had the pleasure of treating the following patient for physical therapy services at [facility].  A summary of our findings can be found in the updated assessment below.  This includes our plan of care.  If you have any questions or concerns regarding these findings, please do not hesitate to contact me at the office phone number checked above.  Thank you for the referral.     Physician Signature:________________________________Date:__________________  By signing above (or electronic signature), therapist’s plan is approved by physician      Current Functional Status: Cardiac complications. Slow to progress  Kenisha Santiago 1955 Today's Assessment: POC this date. Discussed slower progressions - not uncommon with her co-morbidities. She reported pain in her chest yesterday in exercise class - so held most heavier interventions this date. Cont to note soreness and tenderness in posterolateral hip. Noted new addition of tenderness in deep rotators of the hip. Cont to have pain with stairs in anterior and posterior hip. Will cont to benefit from skilled PT interventions to progress to PLOF. Cont to desensitize posterior chain as well as strengthen global hip. May re-introduce spinal opening mobility. Will cont to benefit from skilled PT interventions to progress to PLOF.     Overall Response to Treatment:   [x]Patient is responding well to treatment and improvement is noted with regards to goals   []Patient should continue to improve in reasonable time if they continue HEP   []Patient has plateaued and is no longer responding to skilled PT intervention    []Patient is getting worse and would benefit from return to referring MD   []Patient unable to adhere to initial POC   []Other:       Total Visits: 13     Recommendation:    [x]Continue PT 1-2x / wk for 4 weeks.

## 2024-05-16 ENCOUNTER — HOSPITAL ENCOUNTER (OUTPATIENT)
Dept: PHYSICAL THERAPY | Age: 69
Setting detail: THERAPIES SERIES
Discharge: HOME | End: 2024-05-16
Payer: MEDICARE

## 2024-05-16 PROCEDURE — 97140 MANUAL THERAPY 1/> REGIONS: CPT

## 2024-05-16 PROCEDURE — 97110 THERAPEUTIC EXERCISES: CPT

## 2024-05-16 NOTE — FLOWSHEET NOTE
include: NONE  [x] Patient education on joint protection, postural re-education, activity modification, progression of HEP.        [] Aquatic Therapy    Plan:  Stretch to posterior chain. Strengthen lower legs.    Electronically Signed by Betito Del Cid PT DPT, MS Date: 05/16/2024     Note: Portions of this note have been templated and/or copied from initial evaluation, reassessments and prior notes for documentation efficiency.

## 2024-05-21 ENCOUNTER — HOSPITAL ENCOUNTER (OUTPATIENT)
Dept: PHYSICAL THERAPY | Age: 69
Setting detail: THERAPIES SERIES
Discharge: HOME | End: 2024-05-21
Payer: MEDICARE

## 2024-05-21 PROCEDURE — 97140 MANUAL THERAPY 1/> REGIONS: CPT

## 2024-05-21 PROCEDURE — 97110 THERAPEUTIC EXERCISES: CPT

## 2024-05-21 NOTE — FLOWSHEET NOTE
[facility] - Outpatient Rehabilitation and Therapy [address] [place] [address] office: [phone] fax: [phone]      Physical Therapy: TREATMENT/PROGRESS NOTE   Patient: eKnisha Santiago (69 y.o. female)   Examination Date: 2024   :  1955 MRN: [id number]   Visit #: 15   Insurance Allowable Auth Needed   MN []Yes    [x]No    Insurance: Payor: AETNA MEDICARE / Plan: AETNA MEDICARE-ADVANTAGE PPO / Product Type: Medicare /   Insurance ID: [id number] - (Medicare Managed)  Secondary Insurance (if applicable):    Treatment Diagnosis:     ICD-10-CM    1. Right hip pain  M25.551       2. Decreased strength of lower extremity  R29.898       3. Decreased range of motion of both hips  M25.651     M25.652          Medical Diagnosis:  Iliotibial band syndrome, right leg [M76.31]   Referring Physician: Sandhya Quinonez MD  PCP: Sandhya Quinonez MD       Plan of care signed (Y/N):     Date of Patient follow up with Physician:      Progress Report/POC: No  POC update due: (10 visits /OR AUTH LIMITS, whichever is less)  4/3/2024                                             Precautions/ Contra-indications:           Latex allergy:  NO  Pacemaker:    YES  Contraindications for Manipulation: osteoporosis   Date of Surgery: NA  Other:    Red Flags:  None    C-SSRS Triggered by Intake questionnaire:   [x] No, Questionnaire did not trigger screening.   [] Yes, Patient intake triggered further evaluation      [] C-SSRS Screening completed  [] PCP notified via Plan of Care  [] Emergency services notified     Preferred Language for Healthcare:   [x] English       [] other:    SUBJECTIVE EXAMINATION     Patient stated complaint: Paul states that her trip to [place] went well. She states that she had some pain in her hip. She states that she was taking ibuprofen at 8 on Thursday because of the pain from the massage. She inquired about dry needles. She states that she is not as painful as

## 2024-05-23 ENCOUNTER — HOSPITAL ENCOUNTER (OUTPATIENT)
Dept: PHYSICAL THERAPY | Age: 69
Setting detail: THERAPIES SERIES
Discharge: HOME | End: 2024-05-23
Payer: MEDICARE

## 2024-05-23 PROCEDURE — 97140 MANUAL THERAPY 1/> REGIONS: CPT

## 2024-05-23 PROCEDURE — 97110 THERAPEUTIC EXERCISES: CPT

## 2024-05-23 NOTE — FLOWSHEET NOTE
[facility] - Outpatient Rehabilitation and Therapy [address] [place] [address] office: [phone] fax: [phone]      Physical Therapy: TREATMENT/PROGRESS NOTE   Patient: Kenisha Santiago (69 y.o. female)   Examination Date: 2024   :  1955 MRN: [id number]   Visit #: 16   Insurance Allowable Auth Needed   MN []Yes    [x]No    Insurance: Payor: AETNA MEDICARE / Plan: AETNA MEDICARE-ADVANTAGE PPO / Product Type: Medicare /   Insurance ID: [id number] - (Medicare Managed)  Secondary Insurance (if applicable):    Treatment Diagnosis:     ICD-10-CM    1. Right hip pain  M25.551       2. Decreased strength of lower extremity  R29.898       3. Decreased range of motion of both hips  M25.651     M25.652          Medical Diagnosis:  Iliotibial band syndrome, right leg [M76.31]   Referring Physician: Sandhya Quinonez MD  PCP: Sandhya Quinonez MD       Plan of care signed (Y/N):     Date of Patient follow up with Physician:      Progress Report/POC: No  POC update due: (10 visits /OR AUTH LIMITS, whichever is less)  4/3/2024                                             Precautions/ Contra-indications:           Latex allergy:  NO  Pacemaker:    YES  Contraindications for Manipulation: osteoporosis   Date of Surgery: NA  Other:    Red Flags:  None    C-SSRS Triggered by Intake questionnaire:   [x] No, Questionnaire did not trigger screening.   [] Yes, Patient intake triggered further evaluation      [] C-SSRS Screening completed  [] PCP notified via Plan of Care  [] Emergency services notified     Preferred Language for Healthcare:   [x] English       [] other:    SUBJECTIVE EXAMINATION     Patient stated complaint: Paul presented with her list of medications. She is taking several medications for heart failure and blood pressure - however blood pressure medications are likely for rhythm control over blood pressure specific. Which is likely why she gets light headed every

## 2024-05-29 ENCOUNTER — HOSPITAL ENCOUNTER (OUTPATIENT)
Dept: PHYSICAL THERAPY | Age: 69
Setting detail: THERAPIES SERIES
Discharge: HOME | End: 2024-05-29
Payer: MEDICARE

## 2024-05-29 PROCEDURE — 97110 THERAPEUTIC EXERCISES: CPT

## 2024-05-29 PROCEDURE — 97140 MANUAL THERAPY 1/> REGIONS: CPT

## 2024-05-29 NOTE — FLOWSHEET NOTE
[facility] - Outpatient Rehabilitation and Therapy [address] [place] [address] office: [phone] fax: [phone]      Physical Therapy: TREATMENT/PROGRESS NOTE   Patient: Kenisha Santiago (69 y.o. female)   Examination Date: 2024   :  1955 MRN: [id number]   Visit #: 17   Insurance Allowable Auth Needed   MN []Yes    [x]No    Insurance: Payor: AETNA MEDICARE / Plan: AETNA MEDICARE-ADVANTAGE PPO / Product Type: Medicare /   Insurance ID: [id number] - (Medicare Managed)  Secondary Insurance (if applicable):    Treatment Diagnosis:     ICD-10-CM    1. Right hip pain  M25.551       2. Decreased strength of lower extremity  R29.898       3. Decreased range of motion of both hips  M25.651     M25.652          Medical Diagnosis:  Iliotibial band syndrome, right leg [M76.31]   Referring Physician: Sandhya Quinonez MD  PCP: Sandhya Quinonez MD       Plan of care signed (Y/N):     Date of Patient follow up with Physician:      Progress Report/POC: No  POC update due: (10 visits /OR AUTH LIMITS, whichever is less)  4/3/2024                                             Precautions/ Contra-indications:           Latex allergy:  NO  Pacemaker:    YES  Contraindications for Manipulation: osteoporosis   Date of Surgery: NA  Other:    Red Flags:  None    C-SSRS Triggered by Intake questionnaire:   [x] No, Questionnaire did not trigger screening.   [] Yes, Patient intake triggered further evaluation      [] C-SSRS Screening completed  [] PCP notified via Plan of Care  [] Emergency services notified     Preferred Language for Healthcare:   [x] English       [] other:    SUBJECTIVE EXAMINATION     Patient stated complaint: Paul states that she walked a 3/4 mile yesterday without any issues. She mainly still only has issues with walking up stairs.        Test used Initial score  3/4/24 2024   Pain Summary VAS 7-8/10 0 at rest   Functional questionnaire LEFS 50/80; 37.5% 50;

## 2024-06-04 ENCOUNTER — HOSPITAL ENCOUNTER (OUTPATIENT)
Dept: PHYSICAL THERAPY | Age: 69
Setting detail: THERAPIES SERIES
Discharge: HOME | End: 2024-06-04
Payer: MEDICARE

## 2024-06-04 PROCEDURE — 97110 THERAPEUTIC EXERCISES: CPT

## 2024-06-04 PROCEDURE — 97140 MANUAL THERAPY 1/> REGIONS: CPT

## 2024-06-04 PROCEDURE — 97530 THERAPEUTIC ACTIVITIES: CPT

## 2024-06-04 NOTE — FLOWSHEET NOTE
[] NT       [] Dysfunction noted  Comment:     Falls Risk Assessment (30 days):   Falls Risk assessed and no intervention required.  Time Up and Go (TUG):   Not Assessed        Exercises/Interventions     Therapeutic Ex (93225)  resistance Sets/time Reps Notes/Cues/Progressions   Bike  5'  C subjective   Hip IR red   Towel between knees.   Hip ER Hold  Yellow HC      SLR   Bilateral  Eccentric   Supine hip abd iso       Hip figure 4 stretch  1.5' 2 Bilateral   Hip posterolateral stretch  1.5 2 Bilateral   Hooklying abd iso yellow      Hip abd straight leg iso yellow      Hip Add isometric orange   Manual tracking   Hip clamshell red 4 10 Bilateral   Hip extension 3#    Lumbar flexion on SB     Prone donkey kick     Leg press 130# 4 12 Double leg   Hip abd c bridge red HC   PPT   BOSU stomp lateral Yellow MB press      Posterolateral hip stretch    Bilateral   Hip extension 2 plates Bilateral   Prone quad stretch    Bilateral  Cues to keep hip in neutral   Side stepping straight leg/bent leg Yellow HC   Discontinued          Manual Intervention (04921)  TIME     STM glute min/med/piriformis/ obturator       Stretch posterolateral hip       Hip mobs AP, LAD, AP  12  Anterior   IASTM       STM hip       PA spine    No change in symptoms.    Prone hip stretch    Bilateral   NMR re-education (57704) resistance Sets/time Reps CUES NEEDED                                      Therapeutic Activity (68119)  Sets/time     Edu: ITB versus hip flexor. Related to proximal hip weakness.    Intermittent   SC hip thrust       Sit to stand c walk       Posterior slider    Ankle incline   Posterior step down 6\"-8\" 2 10ea Cues to keep knee over third toe. CONSTANT   Lateral step down 6\"      Split squat c Band Cue    Cues to sink down as opposed to forward.   Assess: ROM, MMT, LEFS, Progress, Goals       Assess: Spine, hip scour, neural tension         Modalities:    No modalities applied this session    Education/Home Exercise

## 2024-06-06 ENCOUNTER — HOSPITAL ENCOUNTER (OUTPATIENT)
Dept: PHYSICAL THERAPY | Age: 69
Setting detail: THERAPIES SERIES
Discharge: HOME | End: 2024-06-06
Payer: MEDICARE

## 2024-06-06 PROCEDURE — 97110 THERAPEUTIC EXERCISES: CPT

## 2024-06-06 PROCEDURE — 97140 MANUAL THERAPY 1/> REGIONS: CPT

## 2024-06-06 NOTE — FLOWSHEET NOTE
Exercises/Interventions     Therapeutic Ex (73234)  resistance Sets/time Reps Notes/Cues/Progressions   Bike  5'  C subjective   Hip IR red   Towel between knees.   Hip ER Hold  Yellow HC      SLR   Bilateral  Eccentric   Supine hip abd iso       Hip figure 4 stretch  - - Bilateral   Hip posterolateral stretch  - - Bilateral   Hooklying abd iso yellow      Hip abd straight leg iso yellow      Hip Add isometric orange   Manual tracking   BOSU Stomp  10\" 10    Hip clamshell green 10\" 10 Bilateral   Hip extension 3#    Lumbar flexion on SB     Prone donkey kick     Leg press hip extension 20 2 10 Bilateral   Leg press 130#   Double leg   Hip abd c bridge red HC   PPT   BOSU stomp lateral Yellow MB press      Posterolateral hip stretch    Bilateral   Hip extension 2 plates Bilateral   Prone quad stretch  1.5' 2 Bilateral  Cues to keep hip in neutral   Side stepping straight leg/bent leg Yellow HC   Discontinued          Manual Intervention (78639)  TIME     STM glute min/med/piriformis/ obturator       Stretch posterolateral hip       Hip mobs AP, LAD, AP  15  Anterior   IASTM GRASTON 2'     STM hip  3'  TFL   PA spine    No change in symptoms.    Prone hip stretch    Bilateral   NMR re-education (41398) resistance Sets/time Reps CUES NEEDED                                      Therapeutic Activity (31623)  Sets/time     Edu: ITB versus hip flexor. Related to proximal hip weakness.    Intermittent   SC hip thrust       Sit to stand c walk       Posterior slider    Ankle incline   Stomp        Posterior step down 6\"-8\"   Cues to keep knee over third toe. CONSTANT   Lateral step down 6\"      Split squat c Band Cue    Cues to sink down as opposed to forward.   Assess: ROM, MMT, LEFS, Progress, Goals       Assess: Spine, hip scour, neural tension         Modalities:    No modalities applied this session    Education/Home Exercise Program: Patient HEP program created electronically.  Refer to [employer] access code:

## 2024-06-11 ENCOUNTER — HOSPITAL ENCOUNTER (OUTPATIENT)
Dept: PHYSICAL THERAPY | Age: 69
Setting detail: THERAPIES SERIES
Discharge: HOME | End: 2024-06-11
Payer: MEDICARE

## 2024-06-11 PROCEDURE — 97110 THERAPEUTIC EXERCISES: CPT

## 2024-06-11 PROCEDURE — 97140 MANUAL THERAPY 1/> REGIONS: CPT

## 2024-06-11 NOTE — FLOWSHEET NOTE
[facility] - Outpatient Rehabilitation and Therapy [address] [place] [address] office: [phone] fax: [phone]      Physical Therapy: TREATMENT/PROGRESS NOTE   Patient: Kenisha Santiago (69 y.o. female)   Examination Date: 2024   :  1955 MRN: [id number]   Visit #: 20   Insurance Allowable Auth Needed   MN []Yes    [x]No    Insurance: Payor: AETNA MEDICARE / Plan: AETNA MEDICARE-ADVANTAGE PPO / Product Type: Medicare /   Insurance ID: [id number] - (Medicare Managed)  Secondary Insurance (if applicable):    Treatment Diagnosis:     ICD-10-CM    1. Right hip pain  M25.551       2. Decreased strength of lower extremity  R29.898       3. Decreased range of motion of both hips  M25.651     M25.652          Medical Diagnosis:  Iliotibial band syndrome, right leg [M76.31]   Referring Physician: Sandhya Quinonez MD  PCP: Sandhya Quinonez MD       Plan of care signed (Y/N):     Date of Patient follow up with Physician:      Progress Report/POC: No  POC update due: (10 visits /OR AUTH LIMITS, whichever is less)  4/3/2024                                             Precautions/ Contra-indications:           Latex allergy:  NO  Pacemaker:    YES  Contraindications for Manipulation: osteoporosis   Date of Surgery: NA  Other:    Red Flags:  None    C-SSRS Triggered by Intake questionnaire:   [x] No, Questionnaire did not trigger screening.   [] Yes, Patient intake triggered further evaluation      [] C-SSRS Screening completed  [] PCP notified via Plan of Care  [] Emergency services notified     Preferred Language for Healthcare:   [x] English       [] other:    SUBJECTIVE EXAMINATION     Patient stated complaint: Paul states that she continues to be sore at night after she completes her PT session. She states that she likes to sleep on her sides and she will get pain right in the hip joint. She states that she has been restless with sleeping recentrly.        Test used  Yes - the patient is able to be screened

## 2024-06-13 ENCOUNTER — HOSPITAL ENCOUNTER (OUTPATIENT)
Dept: PHYSICAL THERAPY | Age: 69
Setting detail: THERAPIES SERIES
Discharge: HOME | End: 2024-06-13
Payer: MEDICARE

## 2024-06-13 PROCEDURE — 97110 THERAPEUTIC EXERCISES: CPT

## 2024-06-13 PROCEDURE — 97140 MANUAL THERAPY 1/> REGIONS: CPT

## 2024-06-13 NOTE — FLOWSHEET NOTE
[facility] - Outpatient Rehabilitation and Therapy [address] [place] [address] office: [phone] fax: [phone]      Physical Therapy: TREATMENT/PROGRESS NOTE   Patient: Kenisha Santiago (69 y.o. female)   Examination Date: 2024   :  1955 MRN: [id number]   Visit #: 21   Insurance Allowable Auth Needed   MN []Yes    [x]No    Insurance: Payor: AETNA MEDICARE / Plan: AETNA MEDICARE-ADVANTAGE PPO / Product Type: Medicare /   Insurance ID: [id number] - (Medicare Managed)  Secondary Insurance (if applicable):    Treatment Diagnosis:     ICD-10-CM    1. Right hip pain  M25.551       2. Decreased strength of lower extremity  R29.898       3. Decreased range of motion of both hips  M25.651     M25.652          Medical Diagnosis:  Iliotibial band syndrome, right leg [M76.31]   Referring Physician: Sandhya Quinonez MD  PCP: Sandhya Quinonez MD       Plan of care signed (Y/N):     Date of Patient follow up with Physician:      Progress Report/POC: No  POC update due: (10 visits /OR AUTH LIMITS, whichever is less)  4/3/2024                                             Precautions/ Contra-indications:           Latex allergy:  NO  Pacemaker:    YES  Contraindications for Manipulation: osteoporosis   Date of Surgery: NA  Other:    Red Flags:  None    C-SSRS Triggered by Intake questionnaire:   [x] No, Questionnaire did not trigger screening.   [] Yes, Patient intake triggered further evaluation      [] C-SSRS Screening completed  [] PCP notified via Plan of Care  [] Emergency services notified     Preferred Language for Healthcare:   [x] English       [] other:    SUBJECTIVE EXAMINATION     Patient stated complaint: Paul states that she tried to walk a quarter mile the other day and she states that she couldn't because her hip started to bother her. She continued to have pain when sleeping       Test used Initial score  3/4/24 2024   Pain Summary VAS 7-8/10 0 at rest

## 2024-06-18 ENCOUNTER — HOSPITAL ENCOUNTER (OUTPATIENT)
Dept: PHYSICAL THERAPY | Age: 69
Setting detail: THERAPIES SERIES
Discharge: HOME | End: 2024-06-18
Payer: MEDICARE

## 2024-06-18 PROCEDURE — 97140 MANUAL THERAPY 1/> REGIONS: CPT

## 2024-06-18 PROCEDURE — 97110 THERAPEUTIC EXERCISES: CPT

## 2024-06-18 NOTE — FLOWSHEET NOTE
lifting and ambulation  (20560) Needle insertion(s) without injection; 1 or 2 muscle(s).  (07076) Needle insertion(s) without injection; 3 or more muscle(s)  (00697) ATTENDED ESTIM. Application of a modality to 1 or more areas; electrical stimulation (manual), each 15 minutes. Attended electrical stimulation requires direct (1-on-1) contact with the patient by the qualified professional/qualified personnel in providing electrical stimulation manually through the use of probes or other devices.      GOALS     Patient stated goal: Relief from pain  Status:  [x] Progressing: [] Met: [] Not Met: [] Adjusted    Therapist goals for Patient:   Short Term Goals: To be achieved in: 2 weeks  Independent in HEP and progression per patient tolerance, in order to progress toward full function and prevent re-injury.    Status: [] Progressing: [x] Met: [] Not Met: [] Adjusted  Patient will have a decrease in pain to 0/10 to help facilitate improvement in movement, function, and ADLs as indicated by functional deficits.   Status: [x] Progressing: [] Met: [] Not Met: [] Adjusted    Long Term Goals: To be achieved in: 8-10 weeks  Disability index score of 10% or less for the LEFS to assist with return top prior level of function.   Status: [x] Progressing: [] Met: [] Not Met: [] Adjusted  Improve hip AROM to 10 degrees or  better  (extension) to allow for proper joint functioning as indicated by patients functional deficits.  Status: [x] Progressing: [] Met: [] Not Met: [] Adjusted  Pt to improve strength to 4+/5 or better of proximal hip to allow for proper muscle and joint use in functional mobility, ADLs and prior level of function   Status: [x] Progressing: [] Met: [] Not Met: [] Adjusted  Patient will return to  Usual work, housework or activities, Usual recreational activities, and walk 1 mile  without increased symptoms or restriction to work towards return to prior level of function.        Status: [x] Progressing: [] Met: []

## 2024-06-25 ENCOUNTER — HOSPITAL ENCOUNTER (OUTPATIENT)
Dept: PHYSICAL THERAPY | Age: 69
Setting detail: THERAPIES SERIES
Discharge: HOME | End: 2024-06-25
Payer: MEDICARE

## 2024-06-25 PROCEDURE — 97110 THERAPEUTIC EXERCISES: CPT

## 2024-06-25 PROCEDURE — 97530 THERAPEUTIC ACTIVITIES: CPT

## 2024-06-25 PROCEDURE — 97140 MANUAL THERAPY 1/> REGIONS: CPT

## 2024-06-25 NOTE — PLAN OF CARE
knee over third toe. CONSTANT   Lateral step down 6\"      Split squat c Band Cue    Cues to sink down as opposed to forward.   Assess: ROM, MMT, LEFS, Progress, Goals  15'     Assess: Spine, hip scour, neural tension         Modalities:    No modalities applied this session    Education/Home Exercise Program: Patient HEP program created electronically.  Refer to [employer] access code: Access Code: [email or web address]        ASSESSMENT   Today's Assessment:  Paul has largely reached a plateau with therapy at this time. We have spent the majority of the time working into anterior hip mobility, posterior recruitment. Had been making some gains in ability to complete stairs, walk for extended periods and general lower extremity strength. Had setback in pain that has not improved. Have noted some radicular pain as well (into ankle). Will refer to ortho for further workup.        Medical Necessity Documentation:  I certify that this patient meets the below criteria necessary for medical necessity for care and/or justification of therapy services:  The patient has a musculoskeletal condition(s) with a corresponding ICD-10 code that is of complexity and severity that require skilled therapeutic intervention. This has a direct and significant impact on the need for therapy and significantly impacts the rate of recovery.       Return to Play: NA     Prognosis for POC: [x] Good [] Fair  [] Poor    Patient requires continued skilled intervention: [x] Yes  [] No      CHARGE CAPTURE     PT CHARGE GRID   CPT Code (TIMED) minutes # CPT Code (UNTIMED) #     Therex (93161)  10 1  EVAL:LOW (28863 - Typically 20 minutes face-to-face)     Neuromusc. Re-ed (52287)    Re-Eval (40673)     Manual (55715) 15 1  Estim Unattended (99330)     Ther. Act (52853) 15 1  Mech. Traction (04234)     Gait (05960)    Dry Needle 1-2 muscle (41916)     Aquatic Therex (03638)    Dry Needle 3+ muscle (20561)     Iontophoresis (26071)    VASO (02133)     Ultrasound

## 2024-07-01 ENCOUNTER — HOSPITAL ENCOUNTER (OUTPATIENT)
Dept: PHYSICAL THERAPY | Age: 69
Setting detail: THERAPIES SERIES
Discharge: HOME | End: 2024-07-01
Payer: MEDICARE

## 2024-07-01 PROCEDURE — 97140 MANUAL THERAPY 1/> REGIONS: CPT

## 2024-07-01 PROCEDURE — 97110 THERAPEUTIC EXERCISES: CPT

## 2024-07-01 NOTE — PLAN OF CARE
Manual (08644) 17 1  Estim Unattended (85851)     Ther. Act (55946)    Mech. Traction (44717)     Gait (57337)    Dry Needle 1-2 muscle (20560)     Aquatic Therex (40611)    Dry Needle 3+ muscle (20561)     Iontophoresis (87214)    VASO (40437)     Ultrasound (89927)    Group Therapy (30765)     Estim Attended (36146)    Canalith Repositioning (82159)     Other:    Other:    Total Timed Code Tx Minutes 35 3       Total Treatment Minutes 35        Charge Justification:  (52308) THERAPEUTIC EXERCISE - Provided verbal/tactile cueing for activities related to strengthening, flexibility, endurance, ROM performed to prevent loss of range of motion, maintain or improve muscular strength or increase flexibility, following either an injury or surgery.   (87363) HOME EXERCISE PROGRAM - Reviewed/Progressed HEP activities related to strengthening, flexibility, endurance, ROM performed to prevent loss of range of motion, maintain or improve muscular strength or increase flexibility, following either an injury or surgery.  (67832) NEUROMUSCULAR RE-EDUCATION - Therapeutic procedure, 1 or more areas, each 15 minutes; neuromuscular reeducation of movement, balance, coordination, kinesthetic sense, posture, and/or proprioception for sitting and/or standing activities  (30254) HOME EXERCISE PROGRAM - Reviewed/Progressed HEP activities related to neuromuscular reeducation of movement, balance, coordination, kinesthetic sense, posture, and/or proprioception for sitting and/or standing activities    (50599) THERAPEUTIC ACTIVITY - use of dynamic activities to improve functional performance. (Ex include squatting, ascending/descending stairs, walking, bending, lifting, catching, throwing, pushing, pulling, jumping.)  Direct, one on one contact, billed in 15-minute increments.  (73239) MANUAL THERAPY -  Manual therapy techniques, 1 or more regions, each 15 minutes (Mobilization/manipulation, manual lymphatic drainage, manual traction)

## 2024-07-03 ENCOUNTER — HOSPITAL ENCOUNTER (OUTPATIENT)
Dept: PHYSICAL THERAPY | Age: 69
Setting detail: THERAPIES SERIES
Discharge: HOME | End: 2024-07-03
Payer: MEDICARE

## 2024-07-03 PROCEDURE — 20561 NDL INSJ W/O NJX 3+ MUSC: CPT

## 2024-07-03 PROCEDURE — 97530 THERAPEUTIC ACTIVITIES: CPT

## 2024-07-03 PROCEDURE — 20560 NDL INSJ W/O NJX 1 OR 2 MUSC: CPT

## 2024-07-03 PROCEDURE — 97110 THERAPEUTIC EXERCISES: CPT

## 2024-07-03 NOTE — FLOWSHEET NOTE
[facility] - Outpatient Rehabilitation and Therapy [address] [place] [address] office: [phone] fax: [phone]      Physical Therapy: TREATMENT/PROGRESS NOTE   Patient: Kenisha Santiago (69 y.o. female)   Examination Date: 2024   :  1955 MRN: [id number]   Visit #: 25   Insurance Allowable Auth Needed   MN []Yes    [x]No    Insurance: Payor: AETNA MEDICARE / Plan: AETNA MEDICARE-ADVANTAGE PPO / Product Type: Medicare /   Insurance ID: [id number] - (Medicare Managed)  Secondary Insurance (if applicable):    Treatment Diagnosis:     ICD-10-CM    1. Right hip pain  M25.551       2. Decreased strength of lower extremity  R29.898       3. Decreased range of motion of both hips  M25.651     M25.652          Medical Diagnosis:  Iliotibial band syndrome, right leg [M76.31]   Referring Physician: Sandhya Quinonez MD  PCP: Sandhya Quinonez MD       Plan of care signed (Y/N):     Date of Patient follow up with Physician:      Progress Report/POC: No  POC update due: (10 visits /OR AUTH LIMITS, whichever is less)  4/3/2024                                             Precautions/ Contra-indications:           Latex allergy:  NO  Pacemaker:    YES  Contraindications for Manipulation: osteoporosis   Date of Surgery: NA  Other:    Red Flags:  None    C-SSRS Triggered by Intake questionnaire:   [x] No, Questionnaire did not trigger screening.   [] Yes, Patient intake triggered further evaluation      [] C-SSRS Screening completed  [] PCP notified via Plan of Care  [] Emergency services notified     Preferred Language for Healthcare:   [x] English       [] other:    SUBJECTIVE EXAMINATION     Patient stated complaint: Paul states that she continues to still have pain at 6 o'clock after she has seen us.      Test used Initial score  3/4/24 2024   Pain Summary VAS 7-8/10 0 at rest   Functional questionnaire LEFS 50/80; 37.5% 50; 37.75%   Other:            Tenderness    Glute

## 2024-07-16 ENCOUNTER — HOSPITAL ENCOUNTER (OUTPATIENT)
Dept: PHYSICAL THERAPY | Age: 69
Setting detail: THERAPIES SERIES
Discharge: HOME | End: 2024-07-16
Payer: MEDICARE

## 2024-07-16 PROCEDURE — 20560 NDL INSJ W/O NJX 1 OR 2 MUSC: CPT

## 2024-07-16 PROCEDURE — 97110 THERAPEUTIC EXERCISES: CPT

## 2024-07-16 NOTE — FLOWSHEET NOTE
WNL      [] NT       [] Dysfunction noted  Comment:     Falls Risk Assessment (30 days):   Falls Risk assessed and no intervention required.  Time Up and Go (TUG):   Not Assessed        Exercises/Interventions     Therapeutic Ex (07911)  resistance Sets/time Reps Notes/Cues/Progressions   Bike    C subjective   Hip IR red   Towel between knees.   Hip ER Hold  Yellow HC      SLR   Bilateral  Eccentric   Supine hip abd iso       Hip figure 4 stretch  - - Bilateral   Hip posterolateral stretch  - - Bilateral   Hooklying abd iso yellow      Hip abd straight leg iso yellow      Hip Add isometric orange   Manual tracking   BOSU Stomp       Hip clamshell green   Bilateral   Hip extension 3#    Lumbar flexion on SB     Prone donkey kick     TrA activation       TrA activation with bridge    Cramped into back   Bridge       SB HS Curl       Hip ER isometric side lying green 10\" 10    Hio IR isometric       Hip abd isometric    BIlateral   Leg press hip extension 20#   Bilateral   Hamstring stretch  1.5'     Hip flexor stretch standing  30\" 4    Hip extension  3-5\" 15    Leg press 130# 3 15 Double leg   Hip abd c bridge red HC   PPT   BOSU stomp lateral Yellow MB press      Figure 4 stretch       Posterolateral hip stretch    Bilateral   Hip extension 2 plates Bilateral   Lumbar flexion       Prone quad stretch    Bilateral  Cues to keep hip in neutral   Side stepping straight leg/bent leg Yellow HC   Discontinued   Prone plinth piked hip extension 5\" 2 10 Straight and bent knee  Try to avoid cramping in hamstring   Hip abd    Bilateral - fatigue   Manual Intervention (84203)  TIME     STM glute min/med/piriformis/ obturator       Stretch posterolateral hip       Ball belt traction       LOOK INTO SPINE       Spine STM and PA Rotation Mobs       Hip mobs AP, LAD, AP  5'  Anterior   IASTM GRASTON      STM hip    TFL/ QL   PA spine    No change in symptoms.    Prone hip stretch    Bilateral   NMR re-education (81558) resistance

## 2024-07-18 ENCOUNTER — HOSPITAL ENCOUNTER (OUTPATIENT)
Dept: PHYSICAL THERAPY | Age: 69
Setting detail: THERAPIES SERIES
Discharge: HOME | End: 2024-07-18
Payer: MEDICARE

## 2024-07-18 PROCEDURE — 97110 THERAPEUTIC EXERCISES: CPT

## 2024-07-18 PROCEDURE — 20561 NDL INSJ W/O NJX 3+ MUSC: CPT

## 2024-07-18 NOTE — FLOWSHEET NOTE
recreational activities, and walk 1 mile  without increased symptoms or restriction to work towards return to prior level of function.        Status: [x] Progressing: [] Met: [] Not Met: [] Adjusted  The patient will return to full recreational activities including exercise classes for her cardiac health without limitations from hip or knee pain in order to continue a healthy and preventative lifestyle.             Status: [x] Progressing: [] Met: [] Not Met: [] Adjusted    TREATMENT PLAN     Frequency/Duration: 1-2x/week for 8-10 weeks for the following treatment interventions:    Interventions:  [x] Therapeutic exercise including: strength training, ROM, including postural re-education.   [x] NMR activation and proprioception, including postural re-education.    [x] Manual therapy as indicated to include: PROM, Gr I-IV mobilizations, and STM  [x] Modalities as needed that may include: NONE  [x] Patient education on joint protection, postural re-education, activity modification, progression of HEP.        [] Aquatic Therapy    Plan:  Stretch to posterior chain. Strengthen lower legs.    Electronically Signed by Betito Del Cid PT DPT, MS Date: 07/18/2024     Note: Portions of this note have been templated and/or copied from initial evaluation, reassessments and prior notes for documentation efficiency.

## 2024-07-30 ENCOUNTER — HOSPITAL ENCOUNTER (OUTPATIENT)
Dept: PHYSICAL THERAPY | Age: 69
Setting detail: THERAPIES SERIES
Discharge: HOME | End: 2024-07-30
Payer: MEDICARE

## 2024-07-30 PROCEDURE — 97110 THERAPEUTIC EXERCISES: CPT

## 2024-07-30 PROCEDURE — 97140 MANUAL THERAPY 1/> REGIONS: CPT

## 2024-07-30 NOTE — FLOWSHEET NOTE
[facility] - Outpatient Rehabilitation and Therapy [address] [place] [address] office: [phone] fax: [phone]      Physical Therapy: TREATMENT/PROGRESS NOTE   Patient: Kenisha Santiago (69 y.o. female)   Examination Date: 2024   :  1955 MRN: [id number]   Visit #: 28   Insurance Allowable Auth Needed   [place] []Yes    [x]No    Insurance: Payor: AETNA MEDICARE / Plan: AETNA MEDICARE-ADVANTAGE PPO / Product Type: Medicare /   Insurance ID: [id number] - (Medicare Managed)  Secondary Insurance (if applicable):    Treatment Diagnosis:     ICD-10-CM    1. Right hip pain  M25.551       2. Decreased strength of lower extremity  R29.898       3. Decreased range of motion of both hips  M25.651     M25.652          Medical Diagnosis:  Iliotibial band syndrome, right leg [M76.31]   Referring Physician: Sandhya Quinonez MD  PCP: Sandhya Quinonez MD       Plan of care signed (Y/N):     Date of Patient follow up with Physician:      Progress Report/POC: No  POC update due: (10 visits /OR AUTH LIMITS, whichever is less)  4/3/2024                                             Precautions/ Contra-indications:           Latex allergy:  NO  Pacemaker:    YES  Contraindications for Manipulation: osteoporosis   Date of Surgery: NA  Other:    Red Flags:  None    C-SSRS Triggered by Intake questionnaire:   [x] No, Questionnaire did not trigger screening.   [] Yes, Patient intake triggered further evaluation      [] C-SSRS Screening completed  [] PCP notified via Plan of Care  [] Emergency services notified     Preferred Language for Healthcare:   [x] English       [] other:    SUBJECTIVE EXAMINATION     Patient stated complaint: Paul states that she has been feeling better. She is able to walk up and down the stairs well without pain. She states that she still gets pain when she gets off the couch and out of the car.      Test used Initial score  3/4/24 2024   Pain Summary VAS 7-8/10 0

## 2024-08-01 ENCOUNTER — HOSPITAL ENCOUNTER (OUTPATIENT)
Dept: PHYSICAL THERAPY | Age: 69
Setting detail: THERAPIES SERIES
Discharge: HOME | End: 2024-08-01
Payer: MEDICARE

## 2024-08-01 PROCEDURE — 97110 THERAPEUTIC EXERCISES: CPT

## 2024-08-01 PROCEDURE — 20560 NDL INSJ W/O NJX 1 OR 2 MUSC: CPT

## 2024-08-01 NOTE — FLOWSHEET NOTE
[facility] - Outpatient Rehabilitation and Therapy [address] [place] [address] office: [phone] fax: [phone]      Physical Therapy: TREATMENT/PROGRESS NOTE   Patient: Kenisha Santiago (69 y.o. female)   Examination Date: 2024   :  1955 MRN: [id number]   Visit #: 29   Insurance Allowable Auth Needed   MN []Yes    [x]No    Insurance: Payor: AETNA MEDICARE / Plan: AETNA MEDICARE-ADVANTAGE PPO / Product Type: Medicare /   Insurance ID: [id number] - (Medicare Managed)  Secondary Insurance (if applicable):    Treatment Diagnosis:     ICD-10-CM    1. Right hip pain  M25.551       2. Decreased strength of lower extremity  R29.898       3. Decreased range of motion of both hips  M25.651     M25.652          Medical Diagnosis:  Iliotibial band syndrome, right leg [M76.31]   Referring Physician: Sandhya Quinonez MD  PCP: Sandhya Quinonez MD       Plan of care signed (Y/N):     Date of Patient follow up with Physician:      Progress Report/POC: No  POC update due: (10 visits /OR AUTH LIMITS, whichever is less)  4/3/2024                                             Precautions/ Contra-indications:           Latex allergy:  NO  Pacemaker:    YES  Contraindications for Manipulation: osteoporosis   Date of Surgery: NA  Other:    Red Flags:  None    C-SSRS Triggered by Intake questionnaire:   [x] No, Questionnaire did not trigger screening.   [] Yes, Patient intake triggered further evaluation      [] C-SSRS Screening completed  [] PCP notified via Plan of Care  [] Emergency services notified     Preferred Language for Healthcare:   [x] English       [] other:    SUBJECTIVE EXAMINATION     Patient stated complaint: Paul states that she is able to walk up and down stairs with weight (carrying bags) now. She states that she still gets pain with driving but not sitting on the couch. She also still gets pain getting out of the car.      Test used Initial score  3/4/24 2024

## 2024-08-08 ENCOUNTER — HOSPITAL ENCOUNTER (OUTPATIENT)
Dept: PHYSICAL THERAPY | Age: 69
Setting detail: THERAPIES SERIES
End: 2024-08-08
Payer: MEDICARE

## 2024-08-13 ENCOUNTER — HOSPITAL ENCOUNTER (OUTPATIENT)
Dept: PHYSICAL THERAPY | Age: 69
Setting detail: THERAPIES SERIES
Discharge: HOME | End: 2024-08-13
Payer: MEDICARE

## 2024-08-13 PROCEDURE — 97110 THERAPEUTIC EXERCISES: CPT

## 2024-08-13 PROCEDURE — 97140 MANUAL THERAPY 1/> REGIONS: CPT

## 2024-08-13 PROCEDURE — 97530 THERAPEUTIC ACTIVITIES: CPT

## 2024-08-13 NOTE — PLAN OF CARE
Physical Therapy Re-Certification Plan of Care/MD UPDATE      Dear Dr. Tete MD ,    We had the pleasure of treating the following patient for physical therapy services at [facility].  A summary of our findings can be found in the updated assessment below.  This includes our plan of care.  If you have any questions or concerns regarding these findings, please do not hesitate to contact me at the office phone number checked above.  Thank you for the referral.     Physician Signature:________________________________Date:__________________  By signing above (or electronic signature), therapist’s plan is approved by physician      Current Functional Status: Progressing slowly.   Kenisha Santiago 1955 Today's Assessment: POC this date. Paul is making slow progressions - especially after injection and then including DN to hip. May be having slow progress due to ozempic and potential muscle wasting effects of this medication - regarding difficulty gaining strength. She has improved in her ability to tolerate functional activities including walking longer distances, navigating up and down stairs without pain. She is still having pain with initial sit to  the posterolateral hip region. Have been working through these muscles with STM and strengthening. Will cont to benefit from skilled PT interventions to progress to PLOF.      Overall Response to Treatment:   []Patient is responding well to treatment and improvement is noted with regards to goals   []Patient should continue to improve in reasonable time if they continue HEP   []Patient has plateaued and is no longer responding to skilled PT intervention    []Patient is getting worse and would benefit from return to referring MD   []Patient unable to adhere to initial POC   [x]Other:       Total Visits: 30     Recommendation:    [x]Continue PT 1-2x / wk for 4 weeks.               []Hold PT, pending MD visit   [] Discharge to [facility].

## 2024-08-15 ENCOUNTER — HOSPITAL ENCOUNTER (OUTPATIENT)
Dept: PHYSICAL THERAPY | Age: 69
Setting detail: THERAPIES SERIES
Discharge: HOME | End: 2024-08-15
Payer: MEDICARE

## 2024-08-15 PROCEDURE — 97110 THERAPEUTIC EXERCISES: CPT

## 2024-08-15 PROCEDURE — 97112 NEUROMUSCULAR REEDUCATION: CPT

## 2024-08-15 NOTE — FLOWSHEET NOTE
some progress. This date progress stretches and strengthening into hip flexor. Avoided DN as Paul states that it helps but she was anxious last time for some reason that she cannot describe. Good tolerance to these activities and no pain. She reported that she does not usually have pain after she warms up a little but will see if thi changes her pain this weekend.     Medical Necessity Documentation:  I certify that this patient meets the below criteria necessary for medical necessity for care and/or justification of therapy services:  The patient has a musculoskeletal condition(s) with a corresponding ICD-10 code that is of complexity and severity that require skilled therapeutic intervention. This has a direct and significant impact on the need for therapy and significantly impacts the rate of recovery.       Return to Play: NA     Prognosis for POC: [x] Good [] Fair  [] Poor    Patient requires continued skilled intervention: [x] Yes  [] No      CHARGE CAPTURE     PT CHARGE GRID   CPT Code (TIMED) minutes # CPT Code (UNTIMED) #     Therex (44232)  26 2  EVAL:LOW (97675 - Typically 20 minutes face-to-face)     Neuromusc. Re-ed (60166)    Re-Eval (25163)     Manual (63981) 14 1  Estim Unattended (66182)     Ther. Act (92560)    Mech. Traction (19161)     Gait (51384)    Dry Needle 1-2 muscle (20560)     Aquatic Therex (20169)    Dry Needle 3+ muscle (20561)     Iontophoresis (30286)    VASO (42469)     Ultrasound (93062)    Group Therapy (89911)     Estim Attended (40226)    Canalith Repositioning (95925)     Other:    Other:    Total Timed Code Tx Minutes 40 3       Total Treatment Minutes 40        Charge Justification:  (72812) THERAPEUTIC EXERCISE - Provided verbal/tactile cueing for activities related to strengthening, flexibility, endurance, ROM performed to prevent loss of range of motion, maintain or improve muscular strength or increase flexibility, following either an injury or surgery.   (03278)

## 2024-08-20 ENCOUNTER — HOSPITAL ENCOUNTER (OUTPATIENT)
Dept: PHYSICAL THERAPY | Age: 69
Setting detail: THERAPIES SERIES
Discharge: HOME | End: 2024-08-20
Payer: MEDICARE

## 2024-08-20 PROCEDURE — 97110 THERAPEUTIC EXERCISES: CPT

## 2024-08-20 PROCEDURE — 97140 MANUAL THERAPY 1/> REGIONS: CPT

## 2024-08-20 NOTE — FLOWSHEET NOTE
[facility] - Outpatient Rehabilitation and Therapy [address] [place] [address] office: [phone] fax: [phone]      Physical Therapy: TREATMENT/PROGRESS NOTE   Patient: Kenisha Santiago (69 y.o. female)   Examination Date: 2024   :  1955 MRN: [id number]   Visit #: 32   Insurance Allowable Auth Needed   MN []Yes    [x]No    Insurance: Payor: AETNA MEDICARE / Plan: AETNA MEDICARE-ADVANTAGE PPO / Product Type: Medicare /   Insurance ID: [id number] - (Medicare Managed)  Secondary Insurance (if applicable):    Treatment Diagnosis:     ICD-10-CM    1. Right hip pain  M25.551       2. Decreased strength of lower extremity  R29.898       3. Decreased range of motion of both hips  M25.651     M25.652          Medical Diagnosis:  Iliotibial band syndrome, right leg [M76.31]   Referring Physician: Sandhya Quinonez MD  PCP: Sandhya Quinonez MD       Plan of care signed (Y/N):     Date of Patient follow up with Physician:      Progress Report/POC: No  POC update due: (10 visits /OR AUTH LIMITS, whichever is less)  2024                                             Precautions/ Contra-indications:           Latex allergy:  NO  Pacemaker:    YES  Contraindications for Manipulation: osteoporosis   Date of Surgery: NA  Other:    Red Flags:  None    C-SSRS Triggered by Intake questionnaire:   [x] No, Questionnaire did not trigger screening.   [] Yes, Patient intake triggered further evaluation      [] C-SSRS Screening completed  [] PCP notified via Plan of Care  [] Emergency services notified     Preferred Language for Healthcare:   [x] English       [] other:    SUBJECTIVE EXAMINATION     Patient stated complaint: Paul states that she did the bridging at home. She is not a huge fan of the clamshells. She had the tightness/ catching when she got off the bike this date.      Test used Initial score  3/4/24 2024   Pain Summary VAS 7-8/10 0 at rest   Functional

## 2024-08-22 ENCOUNTER — HOSPITAL ENCOUNTER (OUTPATIENT)
Dept: PHYSICAL THERAPY | Age: 69
Setting detail: THERAPIES SERIES
Discharge: HOME | End: 2024-08-22
Payer: MEDICARE

## 2024-08-22 PROCEDURE — 97140 MANUAL THERAPY 1/> REGIONS: CPT

## 2024-08-22 PROCEDURE — 97110 THERAPEUTIC EXERCISES: CPT

## 2024-08-22 NOTE — FLOWSHEET NOTE
[facility] - Outpatient Rehabilitation and Therapy [address] [place] [address] office: [phone] fax: [phone]      Physical Therapy: TREATMENT/PROGRESS NOTE   Patient: Kenisha Santiago (69 y.o. female)   Examination Date: 2024   :  1955 MRN: [id number]   Visit #: 33   Insurance Allowable Auth Needed   MN []Yes    [x]No    Insurance: Payor: AETNA MEDICARE / Plan: AETNA MEDICARE-ADVANTAGE PPO / Product Type: Medicare /   Insurance ID: [id number] - (Medicare Managed)  Secondary Insurance (if applicable):    Treatment Diagnosis:     ICD-10-CM    1. Right hip pain  M25.551       2. Decreased strength of lower extremity  R29.898       3. Decreased range of motion of both hips  M25.651     M25.652          Medical Diagnosis:  Iliotibial band syndrome, right leg [M76.31]   Referring Physician: Sandhya Quinonez MD  PCP: Sandhya Quinonez MD       Plan of care signed (Y/N):     Date of Patient follow up with Physician:      Progress Report/POC: No  POC update due: (10 visits /OR AUTH LIMITS, whichever is less)  2024                                             Precautions/ Contra-indications:           Latex allergy:  NO  Pacemaker:    YES  Contraindications for Manipulation: osteoporosis   Date of Surgery: NA  Other:    Red Flags:  None    C-SSRS Triggered by Intake questionnaire:   [x] No, Questionnaire did not trigger screening.   [] Yes, Patient intake triggered further evaluation      [] C-SSRS Screening completed  [] PCP notified via Plan of Care  [] Emergency services notified     Preferred Language for Healthcare:   [x] English       [] other:    SUBJECTIVE EXAMINATION     Patient stated complaint: Paul states she is doing well. She states that her numbness and tingling is still bothering her.      Test used Initial score  3/4/24 2024   Pain Summary VAS 7-8/10 0 at rest   Functional questionnaire LEFS 50/80; 37.5% 55; 31.25%%   Other:

## 2024-08-22 NOTE — FLOWSHEET NOTE
[facility] - Outpatient Rehabilitation and Therapy [address] [place] [address] office: [phone] fax: [phone]      Physical Therapy: TREATMENT/PROGRESS NOTE   Patient: Kenisha Santiago (69 y.o. female)   Examination Date: 2024   :  1955 MRN: [id number]   Visit #: 33   Insurance Allowable Auth Needed   MN []Yes    [x]No    Insurance: Payor: AETNA MEDICARE / Plan: AETNA MEDICARE-ADVANTAGE PPO / Product Type: Medicare /   Insurance ID: [id number] - (Medicare Managed)  Secondary Insurance (if applicable):    Treatment Diagnosis:     ICD-10-CM    1. Right hip pain  M25.551       2. Decreased strength of lower extremity  R29.898       3. Decreased range of motion of both hips  M25.651     M25.652          Medical Diagnosis:  Iliotibial band syndrome, right leg [M76.31]   Referring Physician: Sandhya Quinonez MD  PCP: Sandhya Quinonez MD       Plan of care signed (Y/N):     Date of Patient follow up with Physician:      Progress Report/POC: No  POC update due: (10 visits /OR AUTH LIMITS, whichever is less)  2024                                             Precautions/ Contra-indications:           Latex allergy:  NO  Pacemaker:    YES  Contraindications for Manipulation: osteoporosis   Date of Surgery: NA  Other:    Red Flags:  None    C-SSRS Triggered by Intake questionnaire:   [x] No, Questionnaire did not trigger screening.   [] Yes, Patient intake triggered further evaluation      [] C-SSRS Screening completed  [] PCP notified via Plan of Care  [] Emergency services notified     Preferred Language for Healthcare:   [x] English       [] other:    SUBJECTIVE EXAMINATION     Patient stated complaint: Paul states she is doing well. She states that her numbness and tingling is still bothering her. Her hip still hurts her whens she stands up.      Test used Initial score  3/4/24 2024   Pain Summary VAS 7-8/10 0 at rest   Functional questionnaire LEFS 50/80;

## 2024-08-27 ENCOUNTER — HOSPITAL ENCOUNTER (OUTPATIENT)
Dept: PHYSICAL THERAPY | Age: 69
Setting detail: THERAPIES SERIES
Discharge: HOME | End: 2024-08-27
Payer: MEDICARE

## 2024-08-27 PROCEDURE — 97110 THERAPEUTIC EXERCISES: CPT

## 2024-08-27 PROCEDURE — 97140 MANUAL THERAPY 1/> REGIONS: CPT

## 2024-08-27 NOTE — FLOWSHEET NOTE
[facility] - Outpatient Rehabilitation and Therapy [address] [place] [address] office: [phone] fax: [phone]      Physical Therapy: TREATMENT/PROGRESS NOTE   Patient: Kenisha Santiago (69 y.o. female)   Examination Date: 2024   :  1955 MRN: [id number]   Visit #: 34   Insurance Allowable Auth Needed   MN []Yes    [x]No    Insurance: Payor: AETNA MEDICARE / Plan: AETNA MEDICARE-ADVANTAGE PPO / Product Type: Medicare /   Insurance ID: [id number] - (Medicare Managed)  Secondary Insurance (if applicable):    Treatment Diagnosis:     ICD-10-CM    1. Right hip pain  M25.551       2. Decreased strength of lower extremity  R29.898       3. Decreased range of motion of both hips  M25.651     M25.652          Medical Diagnosis:  Iliotibial band syndrome, right leg [M76.31]   Referring Physician: Sandhya Quinonez MD  PCP: Sandhya Quinonez MD       Plan of care signed (Y/N):     Date of Patient follow up with Physician:      Progress Report/POC: No  POC update due: (10 visits /OR AUTH LIMITS, whichever is less)  2024                                             Precautions/ Contra-indications:           Latex allergy:  NO  Pacemaker:    YES  Contraindications for Manipulation: osteoporosis   Date of Surgery: NA  Other:    Red Flags:  None    C-SSRS Triggered by Intake questionnaire:   [x] No, Questionnaire did not trigger screening.   [] Yes, Patient intake triggered further evaluation      [] C-SSRS Screening completed  [] PCP notified via Plan of Care  [] Emergency services notified     Preferred Language for Healthcare:   [x] English       [] other:    SUBJECTIVE EXAMINATION     Patient stated complaint: Paul states that she continues to have pins and needles in her hands. She usually only has this in her right hand. She needs to see Dr. Green regarding her hip.      Test used Initial score  3/4/24 2024   Pain Summary VAS 7-8/10 0 at rest   Functional     Anteroinferior hip mob       Stretch hip flexor/ TFL  7     Ball belt traction       LAD hip  1'     Spine STM and PA Rotation Mobs       Hip mobs AP, LAD, AP    Anterior   IASTM GRASTON      STM hip    TFL/ QL   PA spine    No change in symptoms.    Prone hip stretch    Bilateral   NMR re-education (70563) resistance Sets/time Reps CUES NEEDED                                      Therapeutic Activity (16384)  Sets/time     Edu: ITB versus hip flexor. Related to proximal hip weakness.    Intermittent   SC hip thrust       Sit to stand c walk       Posterior slider    Ankle incline   Stomp        Edu DN procedure, response, risks, rewards       Posterior step down 6\"-8\"   Cues to keep knee over third toe. CONSTANT   Lateral step down 6\"      Split squat c Band Cue    Cues to sink down as opposed to forward.   Assess: ROM, MMT, LEFS, Progress, Goals       Assess: Spine, hip scour, neural tension         Modalities:    No modalities applied this session    Education/Home Exercise Program: Patient HEP program created electronically.  Refer to [employer] access code: Access Code: [email or web address]    Spoke with Dr. Epstein,  regarding the use of Dry Needling     Dry needling manual therapy: consisted on the placement of 5 needles in the following muscles:  Glute med/ piriformis, TFL/ ITB.  A 40-60 mm needle was inserted, piston, rotated, and coned to produce intramuscular mobilization.  These techniques were used to restore functional range of motion, reduce muscle spasm and induce healing in the corresponding musculature. (87021)  Clean Technique was utilized today while applying Dry needling treatment.  The treatment sites where cleaned with 70% solution of  isopropyl alcohol .  The PT washed their hands and utilized treatment gloves along with hand  prior to inserting the needles.  All needles where removed and discarded in the appropriate sharps container.  MD has given verbal and/or written approval for this

## 2024-08-29 ENCOUNTER — HOSPITAL ENCOUNTER (OUTPATIENT)
Dept: PHYSICAL THERAPY | Age: 69
Setting detail: THERAPIES SERIES
Discharge: HOME | End: 2024-08-29
Payer: MEDICARE

## 2024-08-29 PROCEDURE — 97140 MANUAL THERAPY 1/> REGIONS: CPT

## 2024-08-29 PROCEDURE — 97110 THERAPEUTIC EXERCISES: CPT

## 2024-08-29 NOTE — FLOWSHEET NOTE
[facility] - Outpatient Rehabilitation and Therapy [address] [place] [address] office: [phone] fax: [phone]      Physical Therapy: TREATMENT/PROGRESS NOTE   Patient: Kenisha Santiago (69 y.o. female)   Examination Date: 2024   :  1955 MRN: [id number]   Visit #: 35   Insurance Allowable Auth Needed   MN []Yes    [x]No    Insurance: Payor: AETNA MEDICARE / Plan: AETNA MEDICARE-ADVANTAGE PPO / Product Type: Medicare /   Insurance ID: [id number] - (Medicare Managed)  Secondary Insurance (if applicable):    Treatment Diagnosis:     ICD-10-CM    1. Right hip pain  M25.551       2. Decreased strength of lower extremity  R29.898       3. Decreased range of motion of both hips  M25.651     M25.652          Medical Diagnosis:  Iliotibial band syndrome, right leg [M76.31]   Referring Physician: Sandhya Quinonez MD  PCP: Sandhya Quinonez MD       Plan of care signed (Y/N):     Date of Patient follow up with Physician:      Progress Report/POC: No  POC update due: (10 visits /OR AUTH LIMITS, whichever is less)  2024                                             Precautions/ Contra-indications:           Latex allergy:  NO  Pacemaker:    YES  Contraindications for Manipulation: osteoporosis   Date of Surgery: NA  Other:    Red Flags:  None    C-SSRS Triggered by Intake questionnaire:   [x] No, Questionnaire did not trigger screening.   [] Yes, Patient intake triggered further evaluation      [] C-SSRS Screening completed  [] PCP notified via Plan of Care  [] Emergency services notified     Preferred Language for Healthcare:   [x] English       [] other:    SUBJECTIVE EXAMINATION     Patient stated complaint: Paul states that she sees Dr. Adams today. She states that she is going to mention her tremot and her numbness and tingling. She states that she is still progressing well with walking up and down the stairs.      Test used Initial score  3/4/24 2024   Pain  function.   Status: [x] Progressing: [] Met: [] Not Met: [] Adjusted  Improve hip AROM to 10 degrees or  better  (extension) to allow for proper joint functioning as indicated by patients functional deficits.  Status: [] Progressing: [x] Met: [] Not Met: [] Adjusted  Pt to improve strength to 4+/5 or better of proximal hip to allow for proper muscle and joint use in functional mobility, ADLs and prior level of function   Status: [x] Progressing: [] Met: [] Not Met: [] Adjusted  Patient will return to  Usual work, housework or activities, Usual recreational activities, and walk 1 mile  without increased symptoms or restriction to work towards return to prior level of function.        Status: [x] Progressing: [] Met: [] Not Met: [] Adjusted  The patient will return to full recreational activities including exercise classes for her cardiac health without limitations from hip or knee pain in order to continue a healthy and preventative lifestyle.             Status: [x] Progressing: [] Met: [] Not Met: [] Adjusted    TREATMENT PLAN     Frequency/Duration: 1-2x/week for 8-10 weeks for the following treatment interventions:    Interventions:  [x] Therapeutic exercise including: strength training, ROM, including postural re-education.   [x] NMR activation and proprioception, including postural re-education.    [x] Manual therapy as indicated to include: PROM, Gr I-IV mobilizations, and STM  [x] Modalities as needed that may include: NONE  [x] Patient education on joint protection, postural re-education, activity modification, progression of HEP.        [] Aquatic Therapy    Plan:  Stretch to posterior chain. Deep hip rotator strengthening. STS strengthening.     Electronically Signed by Betito Del Cid PT DPT, MS Date: 08/29/2024     Note: Portions of this note have been templated and/or copied from initial evaluation, reassessments and prior notes for documentation efficiency.

## 2024-09-04 ENCOUNTER — HOSPITAL ENCOUNTER (OUTPATIENT)
Dept: PHYSICAL THERAPY | Age: 69
Setting detail: THERAPIES SERIES
Discharge: HOME | End: 2024-09-04
Payer: MEDICARE

## 2024-09-04 PROCEDURE — 97110 THERAPEUTIC EXERCISES: CPT

## 2024-09-04 PROCEDURE — 97140 MANUAL THERAPY 1/> REGIONS: CPT

## 2024-09-04 NOTE — FLOWSHEET NOTE
Progressing: [x] Met: [] Not Met: [] Adjusted  Pt to improve strength to 4+/5 or better of proximal hip to allow for proper muscle and joint use in functional mobility, ADLs and prior level of function   Status: [x] Progressing: [] Met: [] Not Met: [] Adjusted  Patient will return to  Usual work, housework or activities, Usual recreational activities, and walk 1 mile  without increased symptoms or restriction to work towards return to prior level of function.        Status: [x] Progressing: [] Met: [] Not Met: [] Adjusted  The patient will return to full recreational activities including exercise classes for her cardiac health without limitations from hip or knee pain in order to continue a healthy and preventative lifestyle.             Status: [x] Progressing: [] Met: [] Not Met: [] Adjusted    TREATMENT PLAN     Frequency/Duration: 1-2x/week for 8-10 weeks for the following treatment interventions:    Interventions:  [x] Therapeutic exercise including: strength training, ROM, including postural re-education.   [x] NMR activation and proprioception, including postural re-education.    [x] Manual therapy as indicated to include: PROM, Gr I-IV mobilizations, and STM  [x] Modalities as needed that may include: NONE  [x] Patient education on joint protection, postural re-education, activity modification, progression of HEP.        [] Aquatic Therapy    Plan:  Stretch to posterior chain. Deep hip rotator strengthening. STS strengthening.     Electronically Signed by Betito Del Cid PT DPT, MS Date: 09/04/2024     Note: Portions of this note have been templated and/or copied from initial evaluation, reassessments and prior notes for documentation efficiency.

## 2024-09-05 ENCOUNTER — HOSPITAL ENCOUNTER (OUTPATIENT)
Dept: PHYSICAL THERAPY | Age: 69
Setting detail: THERAPIES SERIES
Discharge: HOME | End: 2024-09-05
Payer: MEDICARE

## 2024-09-05 ENCOUNTER — APPOINTMENT (OUTPATIENT)
Dept: PHYSICAL THERAPY | Age: 69
End: 2024-09-05
Payer: MEDICARE

## 2024-09-05 DIAGNOSIS — M54.2 CERVICAL PAIN: Primary | ICD-10-CM

## 2024-09-05 DIAGNOSIS — R29.898 DECREASED STRENGTH OF TRUNK AND BACK: ICD-10-CM

## 2024-09-05 DIAGNOSIS — R29.898 DECREASED RANGE OF MOTION OF NECK: ICD-10-CM

## 2024-09-05 DIAGNOSIS — M25.69 DECREASED RANGE OF MOTION OF TRUNK AND BACK: ICD-10-CM

## 2024-09-05 PROCEDURE — 97140 MANUAL THERAPY 1/> REGIONS: CPT

## 2024-09-05 PROCEDURE — 97161 PT EVAL LOW COMPLEX 20 MIN: CPT

## 2024-09-05 NOTE — PLAN OF CARE
Met: [] Adjusted  5. The patient will demonstrate improvements in thoracic rotation mobility to full range in standing and sitting in order to demonstrate improvements in adjacent ROM to decrease load to cervical spine. (patient specific functional goal)    [] Progressing: [] Met: [] Not Met: [] Adjusted     Overall Progression Towards Functional goals/ Treatment Progress Update:  [] Patient is progressing as expected towards functional goals listed.    [] Progression is slowed due to complexities/Impairments listed.  [] Progression has been slowed due to co-morbidities.  [x] Plan just implemented, too soon (<30days) to assess goals progression   [] Goals require adjustment due to lack of progress  [] Patient is not progressing as expected and requires additional follow up with physician  [] Other:     TREATMENT PLAN     Frequency/Duration: 1-2x/week for 4-6 weeks for the following treatment interventions:    Interventions:  Therapeutic Exercise (85006) including: strength training, ROM, and functional mobility  Therapeutic Activities (82112) including: functional mobility training and education.  Neuromuscular Re-education (73499) activation and proprioception, including postural re-education.    Manual Therapy (69884) as indicated to include: Passive Range of Motion, Gr I-IV mobilizations, Soft Tissue Mobilization, Dry Needling/IASTM, and Trigger Point Release  Patient education on joint protection, postural re-education, activity modification, and progression of HEP    Plan: POC initiated as per evaluation    Electronically Signed by Betito Del Cid PT  DPT, MS Date: 09/05/2024     Note: Portions of this note have been templated and/or copied from initial evaluation, reassessments and prior notes for documentation efficiency.    Note: If patient does not return for scheduled/recommended follow up visits, this note will serve as a discharge from care along with the most recent update on progress.

## 2024-09-10 ENCOUNTER — APPOINTMENT (OUTPATIENT)
Dept: PHYSICAL THERAPY | Age: 69
End: 2024-09-10
Payer: MEDICARE

## 2024-09-12 ENCOUNTER — HOSPITAL ENCOUNTER (OUTPATIENT)
Dept: PHYSICAL THERAPY | Age: 69
Setting detail: THERAPIES SERIES
Discharge: HOME | End: 2024-09-12
Payer: MEDICARE

## 2024-09-12 ENCOUNTER — APPOINTMENT (OUTPATIENT)
Dept: PHYSICAL THERAPY | Age: 69
End: 2024-09-12
Payer: MEDICARE

## 2024-09-12 PROCEDURE — 97110 THERAPEUTIC EXERCISES: CPT

## 2024-09-12 PROCEDURE — 97140 MANUAL THERAPY 1/> REGIONS: CPT

## 2024-09-17 ENCOUNTER — HOSPITAL ENCOUNTER (OUTPATIENT)
Dept: PHYSICAL THERAPY | Age: 69
Setting detail: THERAPIES SERIES
Discharge: HOME | End: 2024-09-17
Payer: MEDICARE

## 2024-09-17 PROCEDURE — 97110 THERAPEUTIC EXERCISES: CPT

## 2024-09-17 PROCEDURE — 97140 MANUAL THERAPY 1/> REGIONS: CPT

## 2024-09-19 ENCOUNTER — HOSPITAL ENCOUNTER (OUTPATIENT)
Dept: PHYSICAL THERAPY | Age: 69
Setting detail: THERAPIES SERIES
Discharge: HOME | End: 2024-09-19
Payer: MEDICARE

## 2024-09-19 ENCOUNTER — APPOINTMENT (OUTPATIENT)
Dept: PHYSICAL THERAPY | Age: 69
End: 2024-09-19
Payer: MEDICARE

## 2024-09-19 PROCEDURE — 97110 THERAPEUTIC EXERCISES: CPT

## 2024-09-19 PROCEDURE — 97140 MANUAL THERAPY 1/> REGIONS: CPT

## 2024-09-24 ENCOUNTER — APPOINTMENT (OUTPATIENT)
Dept: PHYSICAL THERAPY | Age: 69
End: 2024-09-24
Payer: MEDICARE

## 2024-09-26 ENCOUNTER — HOSPITAL ENCOUNTER (OUTPATIENT)
Dept: PHYSICAL THERAPY | Age: 69
Setting detail: THERAPIES SERIES
Discharge: HOME | End: 2024-09-26
Payer: MEDICARE

## 2024-09-26 PROCEDURE — 97110 THERAPEUTIC EXERCISES: CPT

## 2024-09-26 PROCEDURE — 97140 MANUAL THERAPY 1/> REGIONS: CPT

## 2024-10-01 ENCOUNTER — HOSPITAL ENCOUNTER (OUTPATIENT)
Dept: PHYSICAL THERAPY | Age: 69
Setting detail: THERAPIES SERIES
Discharge: HOME | End: 2024-10-01
Payer: MEDICARE

## 2024-10-01 PROCEDURE — 97110 THERAPEUTIC EXERCISES: CPT

## 2024-10-01 PROCEDURE — 97530 THERAPEUTIC ACTIVITIES: CPT

## 2024-10-03 ENCOUNTER — HOSPITAL ENCOUNTER (OUTPATIENT)
Dept: PHYSICAL THERAPY | Age: 69
Setting detail: THERAPIES SERIES
Discharge: HOME | End: 2024-10-03
Payer: MEDICARE

## 2024-10-03 PROCEDURE — 97140 MANUAL THERAPY 1/> REGIONS: CPT

## 2024-10-03 PROCEDURE — 97110 THERAPEUTIC EXERCISES: CPT

## 2024-10-03 NOTE — FLOWSHEET NOTE
ELBOW Flex/biceps       Ext/triceps       Pronation       supination          WRIST Flexion       Extension       RD       UD                 Palpation:   Patient reported tenderness with palpation  Location: TTP at T1, C7, C5, C4, C3, C2; Right UT > Left, Right Sub occipitals > Left, Left and Right SCMs. Right pec.    Posture:   forward head, rounded shoulders, and increased thoracic kyphosis    Bandages/Dressings/Incisions:  Not Applicable    Dermatomes: Abnormal findings listed below  None, all WNL  Entire right hand    Myotomes: Abnormal findings listed below  None, all WNL    Reflexes: Abnormal findings listed below  All reflexes WNL (2+)    Specific Joint Mobility Testing/Accessory Motions:      Cervical: hypomobility of C4 C5 C6 C7    Thoracic: hypomobility of T1 T2 T3 T4 T5 T6 T7    Gait:    Pattern: WNL  Assistive Device Used: no AD    Special Tests:  [] None Assessed   [] Following tests noted:    Alar ligament/C2 spinous kick test: Negative  Distraction: no relief noted  Median nerve tension test: Negative, Positive on R, and Positive on L  ULTT Positive and Bilateral, lacking 20 degrees elbow extension.     Balance:  [x] WNL      [] NT       [] Dysfunction noted  Comment:     Falls Risk Assessment (30 days):   Falls Risk assessed and no intervention required.  Time Up and Go (TUG):   Not Assessed        Exercises/Interventions     Therapeutic Ex (87173)  resistance Sets/time Reps Notes/Cues/Progressions   UBE  2/2     Scap retraction holds sitting       Prone scap retraction hold  5\" 10    Prone scap with T hold (Thumb up)  5\" 10    Prone scap retraction c extension  5\" 10    Prone cervical retraction  5\" 10    NO money holds  green 5\" 10    ER reactive isometric green 1 5 3 steps - cues not to lose ER   Thoracic rotation at wall    Bilateral   CC side stepping 2 plates 5 3/3    CC retraction and extension 3.5# 5\" 15 Double   CC row 5# Double   Scapular clock (lateral only) yellow 2 5 Bilateral

## 2024-10-08 ENCOUNTER — HOSPITAL ENCOUNTER (OUTPATIENT)
Dept: PHYSICAL THERAPY | Age: 69
Setting detail: THERAPIES SERIES
Discharge: HOME | End: 2024-10-08
Payer: MEDICARE

## 2024-10-08 ENCOUNTER — APPOINTMENT (OUTPATIENT)
Dept: PHYSICAL THERAPY | Age: 69
End: 2024-10-08
Payer: MEDICARE

## 2024-10-08 PROCEDURE — 97110 THERAPEUTIC EXERCISES: CPT

## 2024-10-08 NOTE — PLAN OF CARE
[facility] - Outpatient Rehabilitation and Therapy [address] [place] [address] office: [phone] fax: [phone]      Physical Therapy: TREATMENT/PROGRESS NOTE   Patient: Kenisha Santiago (69 y.o. female)   Examination Date: 10/08/2024   :  1955 MRN: [id number]   Visit #: 39   Insurance Allowable Auth Needed   MN []Yes    [x]No    Insurance: Payor: AETNA MEDICARE / Plan: AETNA MEDICARE-ADVANTAGE PPO / Product Type: Medicare /   Insurance ID: [id number] - (Medicare Managed)  Secondary Insurance (if applicable):    Treatment Diagnosis:     ICD-10-CM    1. Right hip pain  M25.551       2. Decreased strength of lower extremity  R29.898       3. Decreased range of motion of both hips  M25.651     M25.652          Medical Diagnosis:  Iliotibial band syndrome, right leg [M76.31]   Referring Physician: Sandhya Quinonez MD  PCP: Sandhya Quinonez MD       Plan of care signed (Y/N):     Date of Patient follow up with Physician:      Progress Report/POC: yes 2024 - 10/1/2024  POC update due: (10 visits /OR AUTH LIMITS, whichever is less)  2024                                             Precautions/ Contra-indications:           Latex allergy:  NO  Pacemaker:    YES  Contraindications for Manipulation: osteoporosis   Date of Surgery: NA  Other:    Red Flags:  None    C-SSRS Triggered by Intake questionnaire:   [x] No, Questionnaire did not trigger screening.   [] Yes, Patient intake triggered further evaluation      [] C-SSRS Screening completed  [] PCP notified via Plan of Care  [] Emergency services notified     Preferred Language for Healthcare:   [x] English       [] other:    SUBJECTIVE EXAMINATION     Patient stated complaint: Paul reports that she is feeling really good. She states that she is sore after and doing the exercises. She states that her favorite is when she lays on the bed, pulls one leg up and lets the other hang down. She had to cancel her EMG  18

## 2024-10-10 ENCOUNTER — HOSPITAL ENCOUNTER (OUTPATIENT)
Dept: PHYSICAL THERAPY | Age: 69
Setting detail: THERAPIES SERIES
Discharge: HOME | End: 2024-10-10
Payer: MEDICARE

## 2024-10-10 ENCOUNTER — APPOINTMENT (OUTPATIENT)
Dept: PHYSICAL THERAPY | Age: 69
End: 2024-10-10
Payer: MEDICARE

## 2024-10-10 PROCEDURE — 97140 MANUAL THERAPY 1/> REGIONS: CPT

## 2024-10-10 PROCEDURE — 97110 THERAPEUTIC EXERCISES: CPT

## 2024-10-10 NOTE — FLOWSHEET NOTE
[facility] - Outpatient Rehabilitation and Therapy [address] [place] [address] office: [phone] fax: [phone]         Physical Therapy: TREATMENT/PROGRESS NOTE   Patient: Kenisha Santiago (69 y.o. female)   Examination Date: 10/10/2024   :  1955 MRN: [id number]   Visit #: 6   Insurance Allowable Auth Needed   MN []Yes    []No    Insurance: Payor: AETNA MEDICARE / Plan: AETNA MEDICARE-ADVANTAGE PPO / Product Type: Medicare /   Insurance ID: [id number] - (Medicare Managed)  Secondary Insurance (if applicable):    Treatment Diagnosis:     ICD-10-CM    1. Cervical pain  M54.2       2. Decreased range of motion of trunk and back  M25.69       3. Decreased strength of trunk and back  R29.898       4. Decreased range of motion of neck  R29.898          Medical Diagnosis:  Cervicalgia [M54.2]   Referring Physician: Sandhya Quinonez MD  PCP: Sandhya Quinonez MD     Plan of care signed (Y/N):     Date of Patient follow up with Physician:      Plan of Care Report: NO  POC update due: (10 visits /OR AUTH LIMITS, whichever is less)  10/4/2024                                             Medical History:  Comorbidities:  Diabetes (Type I or II)  Pacemaker  Osteoarthritis  Relevant Medical History: Right hip pain, right shoulder pain, numbness and tingling in ahnds.                                          Precautions/ Contra-indications:           Latex allergy:  NO  Pacemaker:     Defibrillator  Contraindications for Manipulation: None  Date of Surgery: NA  Other:    Red Flags:  None    Suicide Screening:   The patient did not verbalize a primary behavioral concern, suicidal ideation, suicidal intent, or demonstrate suicidal behaviors.    Preferred Language for Healthcare:   [x] English       [] other:    SUBJECTIVE EXAMINATION     Patient stated complaint: Paul reports that her neck was sore after last session. She states that she does not recall if the numbness and tingling changed

## 2024-10-15 ENCOUNTER — HOSPITAL ENCOUNTER (OUTPATIENT)
Dept: PHYSICAL THERAPY | Age: 69
Setting detail: THERAPIES SERIES
Discharge: HOME | End: 2024-10-15
Payer: MEDICARE

## 2024-10-15 ENCOUNTER — APPOINTMENT (OUTPATIENT)
Dept: PHYSICAL THERAPY | Age: 69
End: 2024-10-15
Payer: MEDICARE

## 2024-10-15 PROCEDURE — 97110 THERAPEUTIC EXERCISES: CPT

## 2024-10-15 PROCEDURE — 97140 MANUAL THERAPY 1/> REGIONS: CPT

## 2024-10-15 NOTE — FLOWSHEET NOTE
(21893)    Re-Eval (29711)     Manual (91573) 16 1  Estim Unattended (90273)     Ther. Act (16044)    Mech. Traction (07948)     Gait (13659)    Dry Needle 1-2 muscle (20560)     Aquatic Therex (08223)    Dry Needle 3+ muscle (20561)     Iontophoresis (22370)    VASO (04980)     Ultrasound (39265)    Group Therapy (59012)     Estim Attended (84900)    Canalith Repositioning (73807)     Other:    Other:    Total Timed Code Tx Minutes 38 3       Total Treatment Minutes 38        Charge Justification:  (63837) THERAPEUTIC EXERCISE - Provided verbal/tactile cueing for activities related to strengthening, flexibility, endurance, ROM performed to prevent loss of range of motion, maintain or improve muscular strength or increase flexibility, following either an injury or surgery.   (53604) THERAPEUTIC ACTIVITY - use of dynamic activities to improve functional performance. (Ex include squatting, ascending/descending stairs, walking, bending, lifting, catching, throwing, pushing, pulling, jumping.)  Direct, one on one contact, billed in 15-minute increments.  (09843) MANUAL THERAPY -  Manual therapy techniques, 1 or more regions, each 15 minutes (Mobilization/manipulation, manual lymphatic drainage, manual traction) for the purpose of modulating pain, promoting relaxation,  increasing ROM, reducing/eliminating soft tissue swelling/inflammation/restriction, improving soft tissue extensibility and allowing for proper ROM for normal function with self care, mobility, lifting and ambulation  (20560) Needle insertion(s) without injection; 1 or 2 muscle(s).  (20561) Needle insertion(s) without injection; 3 or more muscle(s)      GOALS     Patient stated goal: Relief from pain  Status:  [x] Progressing: [] Met: [] Not Met: [] Adjusted    Therapist goals for Patient:   Short Term Goals: To be achieved in: 2 weeks  Independent in HEP and progression per patient tolerance, in order to progress toward full function and prevent

## 2024-10-17 ENCOUNTER — HOSPITAL ENCOUNTER (OUTPATIENT)
Dept: PHYSICAL THERAPY | Age: 69
Setting detail: THERAPIES SERIES
Discharge: HOME | End: 2024-10-17
Payer: MEDICARE

## 2024-10-17 PROCEDURE — 97140 MANUAL THERAPY 1/> REGIONS: CPT

## 2024-10-17 PROCEDURE — 97110 THERAPEUTIC EXERCISES: CPT

## 2024-10-17 NOTE — FLOWSHEET NOTE
(21204)     Iontophoresis (43031)    VASO (10316)     Ultrasound (77700)    Group Therapy (54815)     Estim Attended (99824)    Canalith Repositioning (32319)     Other:    Other:    Total Timed Code Tx Minutes 40 3       Total Treatment Minutes 40        Charge Justification:  (90533) THERAPEUTIC EXERCISE - Provided verbal/tactile cueing for activities related to strengthening, flexibility, endurance, ROM performed to prevent loss of range of motion, maintain or improve muscular strength or increase flexibility, following either an injury or surgery.   (47123) THERAPEUTIC ACTIVITY - use of dynamic activities to improve functional performance. (Ex include squatting, ascending/descending stairs, walking, bending, lifting, catching, throwing, pushing, pulling, jumping.)  Direct, one on one contact, billed in 15-minute increments.  (90757) MANUAL THERAPY -  Manual therapy techniques, 1 or more regions, each 15 minutes (Mobilization/manipulation, manual lymphatic drainage, manual traction) for the purpose of modulating pain, promoting relaxation,  increasing ROM, reducing/eliminating soft tissue swelling/inflammation/restriction, improving soft tissue extensibility and allowing for proper ROM for normal function with self care, mobility, lifting and ambulation  (73103) Needle insertion(s) without injection; 1 or 2 muscle(s).  (20561) Needle insertion(s) without injection; 3 or more muscle(s)      GOALS     Patient stated goal: Relief from pain  Status:  [x] Progressing: [] Met: [] Not Met: [] Adjusted    Therapist goals for Patient:   Short Term Goals: To be achieved in: 2 weeks  Independent in HEP and progression per patient tolerance, in order to progress toward full function and prevent re-injury.    Status: [] Progressing: [x] Met: [] Not Met: [] Adjusted  Patient will have a decrease in pain to 0/10 to help facilitate improvement in movement, function, and ADLs as indicated by functional deficits.   Status: [x]

## 2024-10-22 ENCOUNTER — HOSPITAL ENCOUNTER (OUTPATIENT)
Dept: PHYSICAL THERAPY | Age: 69
Setting detail: THERAPIES SERIES
Discharge: HOME | End: 2024-10-22
Payer: MEDICARE

## 2024-10-22 ENCOUNTER — APPOINTMENT (OUTPATIENT)
Dept: PHYSICAL THERAPY | Age: 69
End: 2024-10-22
Payer: MEDICARE

## 2024-10-22 PROCEDURE — 97110 THERAPEUTIC EXERCISES: CPT

## 2024-10-22 PROCEDURE — 97140 MANUAL THERAPY 1/> REGIONS: CPT

## 2024-10-22 NOTE — FLOWSHEET NOTE
Pattern: antalgic pattern  Assistive Device Used: no AD    Balance:  [x] WNL      [] NT       [] Dysfunction noted  Comment:     Falls Risk Assessment (30 days):   Falls Risk assessed and no intervention required.  Time Up and Go (TUG):   Not Assessed        Exercises/Interventions     Therapeutic Ex (44815)  resistance Sets/time Reps Notes/Cues/Progressions   Bike    C subjective   Hip IR red   Towel between knees.   Hip ER Hold  Yellow HC      SLR   Bilateral  Eccentric   Supine hip abd iso       Hip figure 4 stretch    Bilateral   Hip posterolateral stretch    Bilateral   Hooklying abd iso yellow      Hip abd straight leg iso yellow      Hip Add isometric orange   Manual tracking   BOSU Stomp       Hip clamshell green   Bilateral   Hip extension 3#    Lumbar flexion on SB     Prone donkey kick     TrA activation       TrA activation with bridge    Cramped into back   Bridge       SB HS Curl       Hip ER isometric side lying green      HEP update      Standing hip flexion Bilaterally.    Clamshell 90 degrees    Hip IR at 90 degrees    Hip IR  Ball iso 90 degrees      Hip abd isometric    BIlateral   Leg press hip extension 20#   Bilateral   Hamstring stretch    BL   Hip flexor stretch standing    BL   Gluteal isometric piked    BIlateral   Suitcase carry 10# 2 20' Bilateral   Lateral step down 8\" 2 15 Progressed from 6\" last session   Retro slider  2 10 Bilateral   Hip extension over plinth  3\" 15 Bilateral   Hip abduction 3 10 Bilateral  Cues to stay in extension.    Ranjan stretch    Bilat   Leg press 120# 3 10 Double leg   Hip abd c bridge red HC   PPT   BOSU stomp lateral Yellow MB press      Hip flexor stretch side lying       Figure 4 stretch  30\" 4    Posterolateral hip stretch    Bilateral   Hip extension 2 plates Bilateral   Lumbar flexion       Prone quad stretch    Bilateral  Cues to keep hip in neutral   Side stepping straight leg/bent leg Yellow HC   Discontinued   Hip abd iso STS black    Hip abd

## 2024-10-24 ENCOUNTER — APPOINTMENT (OUTPATIENT)
Dept: PHYSICAL THERAPY | Age: 69
End: 2024-10-24
Payer: MEDICARE

## 2024-10-29 ENCOUNTER — APPOINTMENT (OUTPATIENT)
Dept: PHYSICAL THERAPY | Age: 69
End: 2024-10-29
Payer: MEDICARE

## 2024-10-29 ENCOUNTER — HOSPITAL ENCOUNTER (OUTPATIENT)
Dept: PHYSICAL THERAPY | Age: 69
Setting detail: THERAPIES SERIES
Discharge: HOME | End: 2024-10-29
Payer: MEDICARE

## 2024-10-29 PROCEDURE — 97140 MANUAL THERAPY 1/> REGIONS: CPT

## 2024-10-29 PROCEDURE — 97110 THERAPEUTIC EXERCISES: CPT

## 2024-10-29 NOTE — FLOWSHEET NOTE
[facility] - Outpatient Rehabilitation and Therapy [address] [place] [address] office: [phone] fax: [phone]      Physical Therapy: TREATMENT/PROGRESS NOTE   Patient: Kenisha Santiago (69 y.o. female)   Examination Date: 10/29/2024   :  1955 MRN: [id number]   Visit #: 43   Insurance Allowable Auth Needed   MN []Yes    [x]No    Insurance: Payor: AETNA MEDICARE / Plan: AETNA MEDICARE-ADVANTAGE PPO / Product Type: Medicare /   Insurance ID: [id number] - (Medicare Managed)  Secondary Insurance (if applicable):    Treatment Diagnosis:     ICD-10-CM    1. Right hip pain  M25.551       2. Decreased strength of lower extremity  R29.898       3. Decreased range of motion of both hips  M25.651     M25.652          Medical Diagnosis:  Iliotibial band syndrome, right leg [M76.31]   Referring Physician: Sandhya Quinonez MD  PCP: Sandhya Quinonez MD       Plan of care signed (Y/N):     Date of Patient follow up with Physician:      Progress Report/POC: yes 2024 - 10/1/2024  POC update due: (10 visits /OR AUTH LIMITS, whichever is less)  2024                                             Precautions/ Contra-indications:           Latex allergy:  NO  Pacemaker:    YES  Contraindications for Manipulation: osteoporosis   Date of Surgery: NA  Other:    Red Flags:  None    C-SSRS Triggered by Intake questionnaire:   [x] No, Questionnaire did not trigger screening.   [] Yes, Patient intake triggered further evaluation      [] C-SSRS Screening completed  [] PCP notified via Plan of Care  [] Emergency services notified     Preferred Language for Healthcare:   [x] English       [] other:    SUBJECTIVE EXAMINATION     Patient stated complaint: Paul reports that she thinks her medication is helping. She states that she walked all around campus (probably a half mile or so). She states that her hip was hurting a little bit but not extensively.      Test used Initial score  3/4/24

## 2024-10-31 ENCOUNTER — APPOINTMENT (OUTPATIENT)
Dept: PHYSICAL THERAPY | Age: 69
End: 2024-10-31
Payer: MEDICARE

## 2024-11-05 ENCOUNTER — HOSPITAL ENCOUNTER (OUTPATIENT)
Dept: PHYSICAL THERAPY | Age: 69
Setting detail: THERAPIES SERIES
Discharge: HOME | End: 2024-11-05
Payer: MEDICARE

## 2024-11-05 ENCOUNTER — APPOINTMENT (OUTPATIENT)
Dept: PHYSICAL THERAPY | Age: 69
End: 2024-11-05
Payer: MEDICARE

## 2024-11-05 PROCEDURE — 97110 THERAPEUTIC EXERCISES: CPT

## 2024-11-05 PROCEDURE — 97140 MANUAL THERAPY 1/> REGIONS: CPT

## 2024-11-05 NOTE — FLOWSHEET NOTE
[facility] - Outpatient Rehabilitation and Therapy [address] [place] [address] office: [phone] fax: [phone]      Physical Therapy: TREATMENT/PROGRESS NOTE   Patient: Kenisha Santiago (69 y.o. female)   Examination Date: 2024   :  1955 MRN: [id number]   Visit #: 44   Insurance Allowable Auth Needed   MN []Yes    [x]No    Insurance: Payor: AETNA MEDICARE / Plan: AETNA MEDICARE-ADVANTAGE PPO / Product Type: Medicare /   Insurance ID: [id number] - (Medicare Managed)  Secondary Insurance (if applicable):    Treatment Diagnosis:     ICD-10-CM    1. Right hip pain  M25.551       2. Decreased strength of lower extremity  R29.898       3. Decreased range of motion of both hips  M25.651     M25.652          Medical Diagnosis:  Iliotibial band syndrome, right leg [M76.31]   Referring Physician: Sandhya Quinonez MD  PCP: Sandhya Quinonez MD       Plan of care signed (Y/N):     Date of Patient follow up with Physician:      Progress Report/POC: yes 2024 - 10/1/2024  POC update due: (10 visits /OR AUTH LIMITS, whichever is less)  2024                                             Precautions/ Contra-indications:           Latex allergy:  NO  Pacemaker:    YES  Contraindications for Manipulation: osteoporosis   Date of Surgery: NA  Other:    Red Flags:  None    C-SSRS Triggered by Intake questionnaire:   [x] No, Questionnaire did not trigger screening.   [] Yes, Patient intake triggered further evaluation      [] C-SSRS Screening completed  [] PCP notified via Plan of Care  [] Emergency services notified     Preferred Language for Healthcare:   [x] English       [] other:    SUBJECTIVE EXAMINATION     Patient stated complaint: Paul reports that she is still having pain but nothing compared to 6 mo ago. She states that taking the pill has been helping her pain as well.     Test used Initial score  3/4/24 2024   Pain Summary VAS 7-8/10 0 at rest

## 2024-11-07 ENCOUNTER — APPOINTMENT (OUTPATIENT)
Dept: PHYSICAL THERAPY | Age: 69
End: 2024-11-07
Payer: MEDICARE

## 2024-11-14 ENCOUNTER — HOSPITAL ENCOUNTER (OUTPATIENT)
Dept: PHYSICAL THERAPY | Age: 69
Setting detail: THERAPIES SERIES
Discharge: HOME | End: 2024-11-14
Payer: MEDICARE

## 2024-11-14 PROCEDURE — 97140 MANUAL THERAPY 1/> REGIONS: CPT

## 2024-11-14 PROCEDURE — 97530 THERAPEUTIC ACTIVITIES: CPT

## 2024-11-14 PROCEDURE — 97110 THERAPEUTIC EXERCISES: CPT

## 2024-11-14 NOTE — PLAN OF CARE
direct and significant impact on the need for therapy and significantly impacts the rate of recovery.       Return to Play: NA     Prognosis for POC: [x] Good [] Fair  [] Poor    Patient requires continued skilled intervention: [x] Yes  [] No      CHARGE CAPTURE     PT CHARGE GRID   CPT Code (TIMED) minutes # CPT Code (UNTIMED) #     Therex (22370)  17 1  EVAL:LOW (89331 - Typically 20 minutes face-to-face)     Neuromusc. Re-ed (82507)    Re-Eval (45377)     Manual (89960) 8 1  Estim Unattended (46630)     Ther. Act (25457) 15 1  Mech. Traction (04544)     Gait (83790)    Dry Needle 1-2 muscle (20560)     Aquatic Therex (73416)    Dry Needle 3+ muscle (20561)     Iontophoresis (63998)    VASO (99475)     Ultrasound (33015)    Group Therapy (06236)     Estim Attended (21207)    Canalith Repositioning (47745)     Other:    Other:    Total Timed Code Tx Minutes 40 3       Total Treatment Minutes 40        Charge Justification:  (98704) THERAPEUTIC EXERCISE - Provided verbal/tactile cueing for activities related to strengthening, flexibility, endurance, ROM performed to prevent loss of range of motion, maintain or improve muscular strength or increase flexibility, following either an injury or surgery.   (56401) THERAPEUTIC ACTIVITY - use of dynamic activities to improve functional performance. (Ex include squatting, ascending/descending stairs, walking, bending, lifting, catching, throwing, pushing, pulling, jumping.)  Direct, one on one contact, billed in 15-minute increments.  (83932) MANUAL THERAPY -  Manual therapy techniques, 1 or more regions, each 15 minutes (Mobilization/manipulation, manual lymphatic drainage, manual traction) for the purpose of modulating pain, promoting relaxation,  increasing ROM, reducing/eliminating soft tissue swelling/inflammation/restriction, improving soft tissue extensibility and allowing for proper ROM for normal function with self care, mobility, lifting and ambulation  (02351)

## 2024-11-19 ENCOUNTER — HOSPITAL ENCOUNTER (OUTPATIENT)
Dept: PHYSICAL THERAPY | Age: 69
Setting detail: THERAPIES SERIES
Discharge: HOME | End: 2024-11-19
Payer: MEDICARE

## 2024-11-19 PROCEDURE — 97110 THERAPEUTIC EXERCISES: CPT

## 2024-11-19 NOTE — FLOWSHEET NOTE
weeks  Independent in HEP and progression per patient tolerance, in order to progress toward full function and prevent re-injury.    Status: [] Progressing: [x] Met: [] Not Met: [] Adjusted  Patient will have a decrease in pain to 0/10 to help facilitate improvement in movement, function, and ADLs as indicated by functional deficits.   Status: [x] Progressing: [] Met: [] Not Met: [] Adjusted    Long Term Goals: To be achieved in: 8-10 weeks  Disability index score of 10% or less for the LEFS to assist with return top prior level of function.   Status: [x] Progressing: [] Met: [] Not Met: [] Adjusted  Improve hip AROM to 10 degrees or  better  (extension) to allow for proper joint functioning as indicated by patients functional deficits.  Status: [] Progressing: [x] Met: [] Not Met: [] Adjusted  Pt to improve strength to 4+/5 or better of proximal hip to allow for proper muscle and joint use in functional mobility, ADLs and prior level of function   Status: [x] Progressing: [] Met: [] Not Met: [] Adjusted  Patient will return to  Usual work, housework or activities, Usual recreational activities, and walk 1 mile  without increased symptoms or restriction to work towards return to prior level of function.        Status: [x] Progressing: [] Met: [] Not Met: [] Adjusted  The patient will return to full recreational activities including exercise classes for her cardiac health without limitations from hip or knee pain in order to continue a healthy and preventative lifestyle.             Status: [x] Progressing: [] Met: [] Not Met: [] Adjusted    TREATMENT PLAN     Frequency/Duration: 1-2x/week for 8-10 weeks for the following treatment interventions:    Interventions:  [x] Therapeutic exercise including: strength training, ROM, including postural re-education.   [x] NMR activation and proprioception, including postural re-education.    [x] Manual therapy as indicated to include: PROM, Gr I-IV mobilizations, and STM  [x]

## 2024-11-26 ENCOUNTER — HOSPITAL ENCOUNTER (OUTPATIENT)
Dept: PHYSICAL THERAPY | Age: 69
Setting detail: THERAPIES SERIES
Discharge: HOME | End: 2024-11-26
Payer: MEDICARE

## 2024-11-26 PROCEDURE — 97140 MANUAL THERAPY 1/> REGIONS: CPT

## 2024-11-26 PROCEDURE — 97110 THERAPEUTIC EXERCISES: CPT

## 2024-11-26 NOTE — FLOWSHEET NOTE
Manual (35533) 6 1  Estim Unattended (11353)     Ther. Act (19488)    Mech. Traction (96338)     Gait (95163)    Dry Needle 1-2 muscle (87973)     Aquatic Therex (35647)    Dry Needle 3+ muscle (20561)     Iontophoresis (20531)    VASO (23725)     Ultrasound (83091)    Group Therapy (60577)     Estim Attended (38722)    Canalith Repositioning (78734)     Other:    Other:    Total Timed Code Tx Minutes 40 3       Total Treatment Minutes 40        Charge Justification:  (57112) THERAPEUTIC EXERCISE - Provided verbal/tactile cueing for activities related to strengthening, flexibility, endurance, ROM performed to prevent loss of range of motion, maintain or improve muscular strength or increase flexibility, following either an injury or surgery.   (01881) THERAPEUTIC ACTIVITY - use of dynamic activities to improve functional performance. (Ex include squatting, ascending/descending stairs, walking, bending, lifting, catching, throwing, pushing, pulling, jumping.)  Direct, one on one contact, billed in 15-minute increments.  (00820) MANUAL THERAPY -  Manual therapy techniques, 1 or more regions, each 15 minutes (Mobilization/manipulation, manual lymphatic drainage, manual traction) for the purpose of modulating pain, promoting relaxation,  increasing ROM, reducing/eliminating soft tissue swelling/inflammation/restriction, improving soft tissue extensibility and allowing for proper ROM for normal function with self care, mobility, lifting and ambulation  (20560) Needle insertion(s) without injection; 1 or 2 muscle(s).  (20561) Needle insertion(s) without injection; 3 or more muscle(s)      GOALS     Patient stated goal: Relief from pain  Status:  [x] Progressing: [] Met: [] Not Met: [] Adjusted    Therapist goals for Patient:   Short Term Goals: To be achieved in: 2 weeks  Independent in HEP and progression per patient tolerance, in order to progress toward full function and prevent re-injury.    Status: [] Progressing:

## 2024-12-10 ENCOUNTER — HOSPITAL ENCOUNTER (OUTPATIENT)
Dept: PHYSICAL THERAPY | Age: 69
Setting detail: THERAPIES SERIES
Discharge: HOME | End: 2024-12-10
Payer: MEDICARE

## 2024-12-10 PROCEDURE — 97110 THERAPEUTIC EXERCISES: CPT

## 2024-12-10 PROCEDURE — 97140 MANUAL THERAPY 1/> REGIONS: CPT

## 2024-12-10 NOTE — FLOWSHEET NOTE
re-injury.    Status: [] Progressing: [x] Met: [] Not Met: [] Adjusted  Patient will have a decrease in pain to 0/10 to help facilitate improvement in movement, function, and ADLs as indicated by functional deficits.   Status: [x] Progressing: [] Met: [] Not Met: [] Adjusted    Long Term Goals: To be achieved in: 8-10 weeks  Disability index score of 10% or less for the LEFS to assist with return top prior level of function.   Status: [x] Progressing: [] Met: [] Not Met: [] Adjusted  Improve hip AROM to 10 degrees or  better  (extension) to allow for proper joint functioning as indicated by patients functional deficits.  Status: [] Progressing: [x] Met: [] Not Met: [] Adjusted  Pt to improve strength to 4+/5 or better of proximal hip to allow for proper muscle and joint use in functional mobility, ADLs and prior level of function   Status: [x] Progressing: [] Met: [] Not Met: [] Adjusted  Patient will return to  Usual work, housework or activities, Usual recreational activities, and walk 1 mile  without increased symptoms or restriction to work towards return to prior level of function.        Status: [x] Progressing: [] Met: [] Not Met: [] Adjusted  The patient will return to full recreational activities including exercise classes for her cardiac health without limitations from hip or knee pain in order to continue a healthy and preventative lifestyle.             Status: [x] Progressing: [] Met: [] Not Met: [] Adjusted    TREATMENT PLAN     Frequency/Duration: 1-2x/week for 8-10 weeks for the following treatment interventions:    Interventions:  [x] Therapeutic exercise including: strength training, ROM, including postural re-education.   [x] NMR activation and proprioception, including postural re-education.    [x] Manual therapy as indicated to include: PROM, Gr I-IV mobilizations, and STM  [x] Modalities as needed that may include: NONE  [x] Patient education on joint protection, postural re-education, activity

## 2024-12-17 ENCOUNTER — HOSPITAL ENCOUNTER (OUTPATIENT)
Dept: PHYSICAL THERAPY | Age: 69
Setting detail: THERAPIES SERIES
Discharge: HOME | End: 2024-12-17
Payer: MEDICARE

## 2024-12-17 PROCEDURE — 97140 MANUAL THERAPY 1/> REGIONS: CPT

## 2024-12-17 PROCEDURE — 97110 THERAPEUTIC EXERCISES: CPT

## 2024-12-17 NOTE — FLOWSHEET NOTE
toward full function and prevent re-injury.    Status: [] Progressing: [x] Met: [] Not Met: [] Adjusted  Patient will have a decrease in pain to 0/10 to help facilitate improvement in movement, function, and ADLs as indicated by functional deficits.   Status: [x] Progressing: [] Met: [] Not Met: [] Adjusted    Long Term Goals: To be achieved in: 8-10 weeks  Disability index score of 10% or less for the LEFS to assist with return top prior level of function.   Status: [x] Progressing: [] Met: [] Not Met: [] Adjusted  Improve hip AROM to 10 degrees or  better  (extension) to allow for proper joint functioning as indicated by patients functional deficits.  Status: [] Progressing: [x] Met: [] Not Met: [] Adjusted  Pt to improve strength to 4+/5 or better of proximal hip to allow for proper muscle and joint use in functional mobility, ADLs and prior level of function   Status: [x] Progressing: [] Met: [] Not Met: [] Adjusted  Patient will return to  Usual work, housework or activities, Usual recreational activities, and walk 1 mile  without increased symptoms or restriction to work towards return to prior level of function.        Status: [x] Progressing: [] Met: [] Not Met: [] Adjusted  The patient will return to full recreational activities including exercise classes for her cardiac health without limitations from hip or knee pain in order to continue a healthy and preventative lifestyle.             Status: [x] Progressing: [] Met: [] Not Met: [] Adjusted    TREATMENT PLAN     Frequency/Duration: 1-2x/week for 8-10 weeks for the following treatment interventions:    Interventions:  [x] Therapeutic exercise including: strength training, ROM, including postural re-education.   [x] NMR activation and proprioception, including postural re-education.    [x] Manual therapy as indicated to include: PROM, Gr I-IV mobilizations, and STM  [x] Modalities as needed that may include: NONE  [x] Patient education on joint

## 2024-12-23 ENCOUNTER — HOSPITAL ENCOUNTER (OUTPATIENT)
Dept: PHYSICAL THERAPY | Age: 69
Setting detail: THERAPIES SERIES
Discharge: HOME | End: 2024-12-23
Payer: MEDICARE

## 2024-12-23 PROCEDURE — 97140 MANUAL THERAPY 1/> REGIONS: CPT

## 2024-12-23 PROCEDURE — 97530 THERAPEUTIC ACTIVITIES: CPT

## 2024-12-23 NOTE — PLAN OF CARE
complexity and severity that require skilled therapeutic intervention. This has a direct and significant impact on the need for therapy and significantly impacts the rate of recovery.       Return to Play: NA     Prognosis for POC: [x] Good [] Fair  [] Poor    Patient requires continued skilled intervention: [x] Yes  [] No      CHARGE CAPTURE     PT CHARGE GRID   CPT Code (TIMED) minutes # CPT Code (UNTIMED) #     Therex (29011)  5 0  EVAL:LOW (97229 - Typically 20 minutes face-to-face)     Neuromusc. Re-ed (27766)    Re-Eval (69587)     Manual (94819) 10 1  Estim Unattended (65871)     Ther. Act (17972) 15' 1  Mech. Traction (63922)     Gait (42140)    Dry Needle 1-2 muscle (35709)     Aquatic Therex (28897)    Dry Needle 3+ muscle (20561)     Iontophoresis (96948)    VASO (06366)     Ultrasound (32866)    Group Therapy (33947)     Estim Attended (74597)    Canalith Repositioning (93347)     Other:    Other:    Total Timed Code Tx Minutes 30 2       Total Treatment Minutes 30        Charge Justification:  (99559) THERAPEUTIC EXERCISE - Provided verbal/tactile cueing for activities related to strengthening, flexibility, endurance, ROM performed to prevent loss of range of motion, maintain or improve muscular strength or increase flexibility, following either an injury or surgery.   (72810) THERAPEUTIC ACTIVITY - use of dynamic activities to improve functional performance. (Ex include squatting, ascending/descending stairs, walking, bending, lifting, catching, throwing, pushing, pulling, jumping.)  Direct, one on one contact, billed in 15-minute increments.  (96759) MANUAL THERAPY -  Manual therapy techniques, 1 or more regions, each 15 minutes (Mobilization/manipulation, manual lymphatic drainage, manual traction) for the purpose of modulating pain, promoting relaxation,  increasing ROM, reducing/eliminating soft tissue swelling/inflammation/restriction, improving soft tissue extensibility and allowing for proper

## 2024-12-30 ENCOUNTER — APPOINTMENT (OUTPATIENT)
Dept: PHYSICAL THERAPY | Age: 69
End: 2024-12-30
Payer: MEDICARE

## 2025-06-17 NOTE — PLAN OF CARE
injection; 3 or more muscle(s)      GOALS     Patient stated goal: Relief from pain  Status:  [x] Progressing: [] Met: [] Not Met: [] Adjusted    Therapist goals for Patient:   Short Term Goals: To be achieved in: 2 weeks  Independent in HEP and progression per patient tolerance, in order to progress toward full function and prevent re-injury.    Status: [] Progressing: [x] Met: [] Not Met: [] Adjusted  Patient will have a decrease in pain to 0/10 to help facilitate improvement in movement, function, and ADLs as indicated by functional deficits.   Status: [x] Progressing: [] Met: [] Not Met: [] Adjusted    Long Term Goals: To be achieved in: 8-10 weeks  Disability index score of 10% or less for the LEFS to assist with return top prior level of function.   Status: [x] Progressing: [] Met: [] Not Met: [] Adjusted  Improve hip AROM to 10 degrees or  better  (extension) to allow for proper joint functioning as indicated by patients functional deficits.  Status: [] Progressing: [x] Met: [] Not Met: [] Adjusted  Pt to improve strength to 4+/5 or better of proximal hip to allow for proper muscle and joint use in functional mobility, ADLs and prior level of function   Status: [x] Progressing: [] Met: [] Not Met: [] Adjusted  Patient will return to  Usual work, housework or activities, Usual recreational activities, and walk 1 mile  without increased symptoms or restriction to work towards return to prior level of function.        Status: [x] Progressing: [] Met: [] Not Met: [] Adjusted  The patient will return to full recreational activities including exercise classes for her cardiac health without limitations from hip or knee pain in order to continue a healthy and preventative lifestyle.             Status: [x] Progressing: [] Met: [] Not Met: [] Adjusted    TREATMENT PLAN     Frequency/Duration: 1-2x/week for 8-10 weeks for the following treatment interventions:    Interventions:  [x] Therapeutic exercise including:  200